# Patient Record
Sex: MALE | Race: WHITE | NOT HISPANIC OR LATINO | Employment: OTHER | URBAN - METROPOLITAN AREA
[De-identification: names, ages, dates, MRNs, and addresses within clinical notes are randomized per-mention and may not be internally consistent; named-entity substitution may affect disease eponyms.]

---

## 2023-09-19 PROBLEM — F10.20 ALCOHOL DEPENDENCE (MULTI): Status: ACTIVE | Noted: 2023-09-19

## 2023-09-19 PROBLEM — M79.606 PAIN OF LOWER EXTREMITY: Status: RESOLVED | Noted: 2023-09-19 | Resolved: 2023-09-19

## 2023-09-19 PROBLEM — F10.939 ALCOHOL WITHDRAWAL SYNDROME (MULTI): Status: RESOLVED | Noted: 2023-09-19 | Resolved: 2023-09-19

## 2023-09-19 PROBLEM — F51.05 INSOMNIA DUE TO OTHER MENTAL DISORDER: Status: ACTIVE | Noted: 2023-09-19

## 2023-09-19 PROBLEM — R73.09 BLOOD GLUCOSE ABNORMAL: Status: ACTIVE | Noted: 2023-09-19

## 2023-09-19 PROBLEM — Z91.89 AT RISK FOR INJURY RELATED TO RESTRAINTS: Status: ACTIVE | Noted: 2023-09-19

## 2023-09-19 PROBLEM — G92.8 TOXIC METABOLIC ENCEPHALOPATHY: Status: ACTIVE | Noted: 2023-09-19

## 2023-09-19 PROBLEM — E83.42 HYPOMAGNESEMIA: Status: ACTIVE | Noted: 2023-09-19

## 2023-09-19 PROBLEM — R40.1 CLOUDED CONSCIOUSNESS: Status: RESOLVED | Noted: 2023-09-19 | Resolved: 2023-09-19

## 2023-09-19 PROBLEM — R07.9 CHEST PAIN: Status: RESOLVED | Noted: 2023-09-19 | Resolved: 2023-09-19

## 2023-09-19 PROBLEM — N17.9 ACUTE RENAL FAILURE SYNDROME (CMS-HCC): Status: RESOLVED | Noted: 2023-09-19 | Resolved: 2023-09-19

## 2023-09-19 PROBLEM — I10 BENIGN ESSENTIAL HYPERTENSION: Status: ACTIVE | Noted: 2023-09-19

## 2023-09-19 PROBLEM — R10.9 ABDOMINAL PAIN: Status: RESOLVED | Noted: 2023-09-19 | Resolved: 2023-09-19

## 2023-09-19 PROBLEM — E87.5 HYPERKALEMIA: Status: ACTIVE | Noted: 2023-09-19

## 2023-09-19 PROBLEM — F41.1 GENERALIZED ANXIETY DISORDER: Status: ACTIVE | Noted: 2023-09-19

## 2023-09-19 PROBLEM — R56.9 SEIZURE (MULTI): Status: ACTIVE | Noted: 2023-09-19

## 2023-09-19 PROBLEM — F99 INSOMNIA DUE TO OTHER MENTAL DISORDER: Status: ACTIVE | Noted: 2023-09-19

## 2023-09-19 PROBLEM — E87.1 HYPONATREMIA: Status: ACTIVE | Noted: 2023-09-19

## 2023-09-19 PROBLEM — F17.213 NICOTINE DEPENDENCE, CIGARETTES, WITH WITHDRAWAL: Status: ACTIVE | Noted: 2023-09-19

## 2023-09-19 PROBLEM — J18.9 PNEUMONIA: Status: RESOLVED | Noted: 2023-09-19 | Resolved: 2023-09-19

## 2023-09-19 PROBLEM — M50.20 DISPLACEMENT OF CERVICAL INTERVERTEBRAL DISC WITHOUT MYELOPATHY: Status: RESOLVED | Noted: 2023-09-19 | Resolved: 2023-09-19

## 2023-09-19 PROBLEM — R94.5 ABNORMAL LIVER FUNCTION: Status: ACTIVE | Noted: 2023-09-19

## 2023-09-19 PROBLEM — E87.8 ELECTROLYTE IMBALANCE: Status: RESOLVED | Noted: 2023-09-19 | Resolved: 2023-09-19

## 2023-09-19 PROBLEM — F33.2 MAJOR DEPRESSIVE DISORDER, RECURRENT SEVERE WITHOUT PSYCHOTIC FEATURES (MULTI): Status: ACTIVE | Noted: 2023-09-19
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
## 2023-09-19 PROBLEM — K70.10 ALCOHOLIC HEPATITIS (MULTI): Status: ACTIVE | Noted: 2023-09-19

## 2023-09-19 PROBLEM — E11.9 DIABETES MELLITUS, TYPE 2 (MULTI): Status: ACTIVE | Noted: 2023-09-19

## 2023-09-19 RX ORDER — METOPROLOL SUCCINATE 50 MG/1
1 TABLET, EXTENDED RELEASE ORAL DAILY
COMMUNITY

## 2023-09-19 RX ORDER — VENLAFAXINE HYDROCHLORIDE 37.5 MG/1
2 TABLET, EXTENDED RELEASE ORAL
COMMUNITY

## 2023-09-19 RX ORDER — LANOLIN ALCOHOL/MO/W.PET/CERES
CREAM (GRAM) TOPICAL
COMMUNITY

## 2023-09-19 RX ORDER — GABAPENTIN 300 MG/1
1 CAPSULE ORAL 3 TIMES DAILY
COMMUNITY

## 2023-09-19 RX ORDER — AMLODIPINE BESYLATE 10 MG/1
1 TABLET ORAL DAILY
COMMUNITY

## 2023-09-19 RX ORDER — FUROSEMIDE 20 MG/1
TABLET ORAL
COMMUNITY

## 2023-09-19 RX ORDER — LOSARTAN POTASSIUM 100 MG/1
1 TABLET ORAL DAILY
COMMUNITY

## 2023-09-19 RX ORDER — METFORMIN HYDROCHLORIDE 500 MG/1
1 TABLET ORAL
COMMUNITY

## 2023-09-19 RX ORDER — CELECOXIB 100 MG/1
1 CAPSULE ORAL DAILY
COMMUNITY
Start: 2022-05-23

## 2023-09-19 RX ORDER — OMEPRAZOLE 40 MG/1
1 CAPSULE, DELAYED RELEASE ORAL DAILY
COMMUNITY

## 2023-09-19 RX ORDER — ESTAZOLAM 2 MG/1
TABLET ORAL
COMMUNITY

## 2023-12-06 ENCOUNTER — HOSPITAL ENCOUNTER (OUTPATIENT)
Dept: RADIOLOGY | Facility: EXTERNAL LOCATION | Age: 68
Discharge: HOME | End: 2023-12-06

## 2023-12-06 DIAGNOSIS — R22.43 LOCALIZED SWELLING, MASS AND LUMP, LOWER LIMB, BILATERAL: ICD-10-CM

## 2023-12-11 ENCOUNTER — OFFICE VISIT (OUTPATIENT)
Dept: ORTHOPEDIC SURGERY | Facility: CLINIC | Age: 68
End: 2023-12-11
Payer: COMMERCIAL

## 2023-12-11 DIAGNOSIS — M54.12 CERVICAL RADICULITIS: ICD-10-CM

## 2023-12-11 DIAGNOSIS — S43.492A OTHER SPRAIN OF LEFT SHOULDER JOINT, INITIAL ENCOUNTER: Primary | ICD-10-CM

## 2023-12-11 PROCEDURE — 99213 OFFICE O/P EST LOW 20 MIN: CPT | Performed by: ORTHOPAEDIC SURGERY

## 2023-12-11 ASSESSMENT — ENCOUNTER SYMPTOMS
CHILLS: 0
SHORTNESS OF BREATH: 0
ARTHRALGIAS: 1
WHEEZING: 0
FATIGUE: 0
FEVER: 0

## 2023-12-11 ASSESSMENT — PAIN - FUNCTIONAL ASSESSMENT: PAIN_FUNCTIONAL_ASSESSMENT: 0-10

## 2023-12-11 ASSESSMENT — PAIN SCALES - GENERAL: PAINLEVEL_OUTOF10: 4

## 2023-12-11 NOTE — PROGRESS NOTES
Reason for Appointment  Chief Complaint   Patient presents with    Left Shoulder - Follow-up     History of Present Illness  Patient is a 68 y.o. male here today for follow-up evaluation of left shoulder pain. Since his last visit, he has not had any changes in symptoms. He is not working. His last treatment included a cervical block and massage therapy which have helped greatly. A shoulder injection also provided long-term relief. No other updates to PMH, allergies, or medications.     Past Medical History:   Diagnosis Date    Abdominal pain 09/19/2023    Accidental fall 09/19/2023    Acute renal failure syndrome (CMS/HCC) 09/19/2023    Chest pain 09/19/2023    Clouded consciousness 09/19/2023    Displacement of cervical intervertebral disc without myelopathy 09/19/2023    Electrolyte imbalance 09/19/2023    Pain of lower extremity 09/19/2023       Past Surgical History:   Procedure Laterality Date    MR HEAD ANGIO WO IV CONTRAST  6/5/2017    MR HEAD ANGIO WO IV CONTRAST [facility]       Medication Documentation Review Audit       Reviewed by Rose Mary Brunson MA (Medical Assistant) on 12/11/23 at 1048      Medication Order Taking? Sig Documenting Provider Last Dose Status   amLODIPine (Norvasc) 10 mg tablet [id number] Yes Take 1 tablet (10 mg) by mouth once daily. Historical Provider, MD Taking Active   celecoxib (CeleBREX) 100 mg capsule [id number] Yes Take 1 capsule (100 mg) by mouth once daily. Historical Provider, MD Taking Active   estazolam (Prosom) 2 mg tablet [id number] Yes (Schedule IV Drug) Oral for 30 Historical Provider, MD Taking Active   furosemide (Lasix) 20 mg tablet [id number] Yes Take by mouth. Historical Provider, MD Taking Active   gabapentin (Neurontin) 300 mg capsule [id number] Yes Take 1 capsule (300 mg) by mouth 3 times a day. Historical Provider, MD Taking Active   losartan (Cozaar) 100 mg tablet [id number] Yes Take 1 tablet (100 mg) by mouth once daily. Historical Provider, MD Taking  Active   magnesium oxide (Mag-Ox) 400 mg (241.3 mg magnesium) tablet [id number] Yes Take by mouth. Historical Provider, MD Taking Active   metFORMIN (Glucophage) 500 mg tablet [id number] Yes Take 1 tablet (500 mg) by mouth 2 times a day with meals. Historical Provider, MD Taking Active   metoprolol succinate XL (Toprol-XL) 50 mg 24 hr tablet [id number] Yes Take 1 tablet (50 mg) by mouth once daily. Historical Provider, MD Taking Active   omeprazole (PriLOSEC) 40 mg DR capsule [id number] Yes Take 1 capsule (40 mg) by mouth once daily. Historical Provider, MD Taking Active   venlafaxine 37.5 mg 24 hr tablet [id number] Yes Take 2 tablets (75 mg) by mouth once daily with a meal. take 1 tablet by mouth once daily WITH FOOD FOR 7 DAYS THEN TAKE 2 TABLETS DAILY Historical Provider, MD Taking Active                    Allergies   Allergen Reactions    Celecoxib Hives       Review of Systems   Constitutional:  Negative for chills, fatigue and fever.   Respiratory:  Negative for shortness of breath and wheezing.    Cardiovascular:  Negative for chest pain and leg swelling.   Musculoskeletal:  Positive for arthralgias.   All other systems reviewed and are negative.      Exam   On exam of the left shoulder, he has 140 degrees of forward flexion, cuff strength is maintained in internal and external rotation, there is some joint line tenderness today, left trap tenderness, no gross atrophy, negative Tripp's sign    Assessment   Encounter Diagnoses   Name Primary?    Other sprain of left shoulder joint, initial encounter Yes    Cervical radiculitis      Plan   Cervical block and massage are the mainstay of treatment because at this point the symptoms appear to be coming from the neck and not the shoulder. He will call if he has any issues or new symptoms. Follow-up in 4 months.     Cecelia MORTON, attpoonam that this documentation has been prepared under the direction and in the presence of Anup Mcconnell MD. By signing below, PRAVEEN  Anup Mcconnell MD, personally performed the services described in this documentation. All medical record entries made by the scribe were at my direction and in my presence. I have reviewed the chart and agree that the record reflects my personal performance and is accurate and complete. 12/11/23

## 2024-04-15 ENCOUNTER — OFFICE VISIT (OUTPATIENT)
Dept: ORTHOPEDIC SURGERY | Facility: CLINIC | Age: 69
End: 2024-04-15
Payer: COMMERCIAL

## 2024-04-15 DIAGNOSIS — M54.12 CERVICAL RADICULITIS: ICD-10-CM

## 2024-04-15 DIAGNOSIS — S43.492A OTHER SPRAIN OF LEFT SHOULDER JOINT, INITIAL ENCOUNTER: Primary | ICD-10-CM

## 2024-04-15 PROCEDURE — 99213 OFFICE O/P EST LOW 20 MIN: CPT | Performed by: ORTHOPAEDIC SURGERY

## 2024-04-15 ASSESSMENT — ENCOUNTER SYMPTOMS
SHORTNESS OF BREATH: 0
CHILLS: 0
WHEEZING: 0
FEVER: 0
FATIGUE: 0
ARTHRALGIAS: 1

## 2024-04-15 ASSESSMENT — PAIN - FUNCTIONAL ASSESSMENT: PAIN_FUNCTIONAL_ASSESSMENT: 0-10

## 2024-04-15 ASSESSMENT — PAIN SCALES - GENERAL: PAINLEVEL_OUTOF10: 6

## 2024-04-15 NOTE — PROGRESS NOTES
Reason for Appointment  Chief Complaint   Patient presents with    Left Shoulder - Follow-up     History of Present Illness  Patient is a 69 y.o. male here today for follow-up evaluation of his left shoulder. Since his last visit, his shoulder pain has been at baseline. He has sharp, anterior shoulder pain that is worse with lifting. He is not currently working. It has been some time since he last had cervical blocks or massage therapy. He is approved for massage therapy at [facility] for the stiffness in his neck. No other updates to [facility], allergies, or medications.     Past Medical History:   Diagnosis Date    Abdominal pain 09/19/2023    Accidental fall 09/19/2023    Acute renal failure syndrome (CMS-HCC) 09/19/2023    Chest pain 09/19/2023    Clouded consciousness 09/19/2023    Displacement of cervical intervertebral disc without myelopathy 09/19/2023    Electrolyte imbalance 09/19/2023    Pain of lower extremity 09/19/2023       Past Surgical History:   Procedure Laterality Date    MR HEAD ANGIO WO IV CONTRAST  6/5/2017    MR HEAD ANGIO WO IV CONTRAST [facility] INPATIENT LEGACY       Medication Documentation Review Audit       Reviewed by Rose Mary Brunson MA (Medical Assistant) on 04/15/24 at 1054      Medication Order Taking? Sig Documenting Provider Last Dose Status   amLODIPine (Norvasc) 10 mg tablet [id number] Yes Take 1 tablet (10 mg) by mouth once daily. Historical Provider, MD Taking Active   celecoxib (CeleBREX) 100 mg capsule [id number] Yes Take 1 capsule (100 mg) by mouth once daily. Historical Provider, MD Taking Active   estazolam (Prosom) 2 mg tablet [id number] Yes (Schedule IV Drug) Oral for 30 Historical Provider, MD Taking Active   furosemide (Lasix) 20 mg tablet [id number] Yes Take by mouth. Historical Provider, MD Taking Active   gabapentin (Neurontin) 300 mg capsule [id number] Yes Take 1 capsule (300 mg) by mouth 3 times a day. Historical Provider, MD Taking Active   losartan (Cozaar) 100 mg tablet  [id number] Yes Take 1 tablet (100 mg) by mouth once daily. Historical Provider, MD Taking Active   magnesium oxide (Mag-Ox) 400 mg (241.3 mg magnesium) tablet [id number] Yes Take by mouth. Historical Provider, MD Taking Active   metFORMIN (Glucophage) 500 mg tablet [id number] Yes Take 1 tablet (500 mg) by mouth 2 times a day with meals. Historical Provider, MD Taking Active   metoprolol succinate XL (Toprol-XL) 50 mg 24 hr tablet [id number] Yes Take 1 tablet (50 mg) by mouth once daily. Historical Provider, MD Taking Active   omeprazole (PriLOSEC) 40 mg DR capsule [id number] Yes Take 1 capsule (40 mg) by mouth once daily. Historical Provider, MD Taking Active   venlafaxine 37.5 mg 24 hr tablet [id number] Yes Take 2 tablets (75 mg) by mouth once daily with breakfast. take 1 tablet by mouth once daily WITH FOOD FOR 7 DAYS THEN TAKE 2 TABLETS DAILY Historical Provider, MD Taking Active                    Allergies   Allergen Reactions    Celecoxib Hives       Review of Systems   Constitutional:  Negative for chills, fatigue and fever.   Respiratory:  Negative for shortness of breath and wheezing.    Cardiovascular:  Negative for chest pain and leg swelling.   Musculoskeletal:  Positive for arthralgias.   All other systems reviewed and are negative.      Exam   On exam of the left arm, he has 130 degrees of elevation, sourness for jody cervical region, cuff strength is maintained today, more joint line tenderness than biceps tenderness, mildly positive impingement sign, good pulses, good sensation    Assessment   Encounter Diagnoses   Name Primary?    Other sprain of left shoulder joint, initial encounter Yes    Cervical radiculitis        Plan   Most of his symptoms at present are coming from the neck. We did discuss future injections to the shoulder, but these should only be administered as needed to prevent further deterioration of the shoulder. Follow-up in 4 months.     ICecelia, attest that this documentation  has been prepared under the direction and in the presence of Anup Mcconnell MD. By signing below, I, Anup Mcconnell MD, personally performed the services described in this documentation. All medical record entries made by the scribe were at my direction and in my presence. I have reviewed the chart and agree that the record reflects my personal performance and is accurate and complete. 04/15/24

## 2024-06-21 ENCOUNTER — HOSPITAL ENCOUNTER (EMERGENCY)
Facility: HOSPITAL | Age: 69
Discharge: HOME | End: 2024-06-21
Attending: STUDENT IN AN ORGANIZED HEALTH CARE EDUCATION/TRAINING PROGRAM
Payer: MEDICARE

## 2024-06-21 ENCOUNTER — HOSPITAL ENCOUNTER (OUTPATIENT)
Dept: CARDIOLOGY | Facility: HOSPITAL | Age: 69
Discharge: HOME | End: 2024-06-21
Payer: MEDICARE

## 2024-06-21 VITALS
HEART RATE: 84 BPM | HEIGHT: 67 IN | BODY MASS INDEX: 29.03 KG/M2 | SYSTOLIC BLOOD PRESSURE: 134 MMHG | DIASTOLIC BLOOD PRESSURE: 72 MMHG | TEMPERATURE: 98.4 F | RESPIRATION RATE: 18 BRPM | WEIGHT: 185 LBS | OXYGEN SATURATION: 98 %

## 2024-06-21 DIAGNOSIS — M54.2 NECK PAIN: Primary | ICD-10-CM

## 2024-06-21 DIAGNOSIS — F10.920 ALCOHOLIC INTOXICATION WITHOUT COMPLICATION (CMS-HCC): ICD-10-CM

## 2024-06-21 LAB
ALBUMIN SERPL-MCNC: 3.9 G/DL (ref 3.5–5)
ALP BLD-CCNC: 158 U/L (ref 35–125)
ALT SERPL-CCNC: 59 U/L (ref 5–40)
AMPHETAMINES UR QL SCN>1000 NG/ML: NEGATIVE
ANION GAP SERPL CALC-SCNC: 14 MMOL/L
APAP SERPL-MCNC: <5 UG/ML
APPEARANCE UR: CLEAR
AST SERPL-CCNC: 59 U/L (ref 5–40)
BARBITURATES UR QL SCN>300 NG/ML: NEGATIVE
BASOPHILS # BLD AUTO: 0.04 X10*3/UL (ref 0–0.1)
BASOPHILS NFR BLD AUTO: 0.9 %
BENZODIAZ UR QL SCN>300 NG/ML: POSITIVE
BILIRUB SERPL-MCNC: 0.7 MG/DL (ref 0.1–1.2)
BILIRUB UR STRIP.AUTO-MCNC: NEGATIVE MG/DL
BUN SERPL-MCNC: 5 MG/DL (ref 8–25)
BZE UR QL SCN>300 NG/ML: NEGATIVE
CALCIUM SERPL-MCNC: 9 MG/DL (ref 8.5–10.4)
CANNABINOIDS UR QL SCN>50 NG/ML: NEGATIVE
CHLORIDE SERPL-SCNC: 90 MMOL/L (ref 97–107)
CO2 SERPL-SCNC: 25 MMOL/L (ref 24–31)
COLOR UR: ABNORMAL
CREAT SERPL-MCNC: 0.7 MG/DL (ref 0.4–1.6)
EGFRCR SERPLBLD CKD-EPI 2021: >90 ML/MIN/1.73M*2
EOSINOPHIL # BLD AUTO: 0.19 X10*3/UL (ref 0–0.7)
EOSINOPHIL NFR BLD AUTO: 4.3 %
ERYTHROCYTE [DISTWIDTH] IN BLOOD BY AUTOMATED COUNT: 11.6 % (ref 11.5–14.5)
ETHANOL SERPL-MCNC: 0.26 G/DL
FENTANYL+NORFENTANYL UR QL SCN: NEGATIVE
GLUCOSE SERPL-MCNC: 99 MG/DL (ref 65–99)
GLUCOSE UR STRIP.AUTO-MCNC: NORMAL MG/DL
HCT VFR BLD AUTO: 34.2 % (ref 41–52)
HGB BLD-MCNC: 12 G/DL (ref 13.5–17.5)
IMM GRANULOCYTES # BLD AUTO: 0.02 X10*3/UL (ref 0–0.7)
IMM GRANULOCYTES NFR BLD AUTO: 0.5 % (ref 0–0.9)
KETONES UR STRIP.AUTO-MCNC: NEGATIVE MG/DL
LEUKOCYTE ESTERASE UR QL STRIP.AUTO: NEGATIVE
LYMPHOCYTES # BLD AUTO: 1.29 X10*3/UL (ref 1.2–4.8)
LYMPHOCYTES NFR BLD AUTO: 29.5 %
MCH RBC QN AUTO: 33 PG (ref 26–34)
MCHC RBC AUTO-ENTMCNC: 35.1 G/DL (ref 32–36)
MCV RBC AUTO: 94 FL (ref 80–100)
METHADONE UR QL SCN>300 NG/ML: NEGATIVE
MONOCYTES # BLD AUTO: 0.38 X10*3/UL (ref 0.1–1)
MONOCYTES NFR BLD AUTO: 8.7 %
NEUTROPHILS # BLD AUTO: 2.46 X10*3/UL (ref 1.2–7.7)
NEUTROPHILS NFR BLD AUTO: 56.1 %
NITRITE UR QL STRIP.AUTO: NEGATIVE
NRBC BLD-RTO: 0 /100 WBCS (ref 0–0)
OPIATES UR QL SCN>300 NG/ML: NEGATIVE
OXYCODONE UR QL: NEGATIVE
PCP UR QL SCN>25 NG/ML: NEGATIVE
PH UR STRIP.AUTO: 6 [PH]
PLATELET # BLD AUTO: 120 X10*3/UL (ref 150–450)
POTASSIUM SERPL-SCNC: 4.6 MMOL/L (ref 3.4–5.1)
PROT SERPL-MCNC: 6.7 G/DL (ref 5.9–7.9)
PROT UR STRIP.AUTO-MCNC: NEGATIVE MG/DL
RBC # BLD AUTO: 3.64 X10*6/UL (ref 4.5–5.9)
RBC # UR STRIP.AUTO: NEGATIVE /UL
SALICYLATES SERPL-MCNC: <0 MG/DL
SODIUM SERPL-SCNC: 129 MMOL/L (ref 133–145)
SP GR UR STRIP.AUTO: 1
UROBILINOGEN UR STRIP.AUTO-MCNC: NORMAL MG/DL
WBC # BLD AUTO: 4.4 X10*3/UL (ref 4.4–11.3)

## 2024-06-21 PROCEDURE — 80053 COMPREHEN METABOLIC PANEL: CPT | Performed by: PHYSICIAN ASSISTANT

## 2024-06-21 PROCEDURE — 99285 EMERGENCY DEPT VISIT HI MDM: CPT | Mod: 25

## 2024-06-21 PROCEDURE — 85025 COMPLETE CBC W/AUTO DIFF WBC: CPT | Performed by: PHYSICIAN ASSISTANT

## 2024-06-21 PROCEDURE — 96375 TX/PRO/DX INJ NEW DRUG ADDON: CPT

## 2024-06-21 PROCEDURE — 2500000004 HC RX 250 GENERAL PHARMACY W/ HCPCS (ALT 636 FOR OP/ED): Performed by: PHYSICIAN ASSISTANT

## 2024-06-21 PROCEDURE — 96374 THER/PROPH/DIAG INJ IV PUSH: CPT

## 2024-06-21 PROCEDURE — 96361 HYDRATE IV INFUSION ADD-ON: CPT

## 2024-06-21 PROCEDURE — 36415 COLL VENOUS BLD VENIPUNCTURE: CPT | Performed by: PHYSICIAN ASSISTANT

## 2024-06-21 PROCEDURE — 93005 ELECTROCARDIOGRAM TRACING: CPT

## 2024-06-21 PROCEDURE — 81003 URINALYSIS AUTO W/O SCOPE: CPT | Performed by: PHYSICIAN ASSISTANT

## 2024-06-21 PROCEDURE — 80307 DRUG TEST PRSMV CHEM ANLYZR: CPT | Performed by: PHYSICIAN ASSISTANT

## 2024-06-21 PROCEDURE — 80143 DRUG ASSAY ACETAMINOPHEN: CPT | Performed by: PHYSICIAN ASSISTANT

## 2024-06-21 RX ORDER — DIPHENHYDRAMINE HYDROCHLORIDE 50 MG/ML
25 INJECTION INTRAMUSCULAR; INTRAVENOUS ONCE
Status: COMPLETED | OUTPATIENT
Start: 2024-06-21 | End: 2024-06-21

## 2024-06-21 RX ORDER — KETOROLAC TROMETHAMINE 30 MG/ML
15 INJECTION, SOLUTION INTRAMUSCULAR; INTRAVENOUS ONCE
Status: COMPLETED | OUTPATIENT
Start: 2024-06-21 | End: 2024-06-21

## 2024-06-21 ASSESSMENT — COLUMBIA-SUICIDE SEVERITY RATING SCALE - C-SSRS
6. HAVE YOU EVER DONE ANYTHING, STARTED TO DO ANYTHING, OR PREPARED TO DO ANYTHING TO END YOUR LIFE?: NO
2. HAVE YOU ACTUALLY HAD ANY THOUGHTS OF KILLING YOURSELF?: NO
1. IN THE PAST MONTH, HAVE YOU WISHED YOU WERE DEAD OR WISHED YOU COULD GO TO SLEEP AND NOT WAKE UP?: NO

## 2024-06-21 ASSESSMENT — PAIN SCALES - GENERAL: PAINLEVEL_OUTOF10: 10 - WORST POSSIBLE PAIN

## 2024-06-21 ASSESSMENT — PAIN DESCRIPTION - LOCATION: LOCATION: SHOULDER

## 2024-06-21 ASSESSMENT — PAIN DESCRIPTION - PAIN TYPE: TYPE: CHRONIC PAIN

## 2024-06-21 ASSESSMENT — PAIN - FUNCTIONAL ASSESSMENT: PAIN_FUNCTIONAL_ASSESSMENT: 0-10

## 2024-06-21 NOTE — PROGRESS NOTES
"Social Work Note  SS consult received for 68y/o male who presented to [facility] ED with c/o severe neck pain and alcohol intoxication. BAL 0.259, tox positive for benzos. Patient initially stated to providers that he was not interested in alcohol abuse treatment, but later during visit stated that he was. Peer support and SW met FTF with patient to discuss. Patient A&Ox4, cooperative, though with slow, slurred speech. When SW met with patient, patient stated he was not interested in treatment at this time, only wanted \"a card\" in order to contact peer support. Patient stating he drinks daily, either beer or bourbon, varying amounts, but at most typically 1/2 pint of bourbon or 12 beers. Last drank earlier this afternoon. Reports alcohol abuse issues for many years. Denies hx formal treatment for alcohol. Denies hx withdrawal seizures. States longest period of sobriety was 1.5 years, following the death of his mother. SW confirmed with patient multiple times during discussion that he was certain he did not want detox at this time. Patient declines, and appears focused on his neck pain, expressing frustration that ER is \"not doing anything\" for him. (Patient was recently administered toradol IV). SW encouraged patient to consider alcohol detox, and let staff know if he changes his mind prior to discharge from the ED. Patient agreeable. Peer support aware that patient is interested in follow-up phone call. No further SW recommendations at this time.   "

## 2024-06-21 NOTE — ED PROVIDER NOTES
"HPI   Chief Complaint   Patient presents with    Alcohol Intoxication    pain management       HPI     Patient is a 69-year-old male with a history of alcohol dependence presenting for evaluation of neck and shoulder pain.  Patient states that the pain is chronic and has been present for the last 7 years.  He follows with a doctor and pain management and is prescribed gabapentin for his pain.  Patient states that he took the gabapentin today but did not have relief.  He denies any new injury.  The patient states that he does drink daily as he drinks to take the edge off of his pain.  He came in today to seek aid with his pain relief.  He states that he drank half a pint of bourbon today.  He does admit to drinking every day, stating that it \"depends on his pain level\" how much he does drink.  He is not interested in any inpatient detoxification therapy.  He denies chest pain or shortness of breath.  He denies abdominal pain, nausea, vomiting, diarrhea or constipation.  Denies SI or HI.  He denies hallucinations or delusions.  He admits to tobacco use.  No drug use.               [place] Coma Scale Score: 15                     Patient History   Past Medical History:   Diagnosis Date    Abdominal pain 09/19/2023    Accidental fall 09/19/2023    Acute renal failure syndrome (CMS-HCC) 09/19/2023    Chest pain 09/19/2023    Clouded consciousness 09/19/2023    Displacement of cervical intervertebral disc without myelopathy 09/19/2023    Electrolyte imbalance 09/19/2023    Pain of lower extremity 09/19/2023     Past Surgical History:   Procedure Laterality Date    MR HEAD ANGIO WO IV CONTRAST  6/5/2017    MR HEAD ANGIO WO IV CONTRAST [facility]     No family history on file.  Social History     Tobacco Use    Smoking status: Every Day     Types: Cigarettes    Smokeless tobacco: Never   Substance Use Topics    Alcohol use: Not Currently    Drug use: Defer       Physical Exam   ED Triage Vitals [06/21/24 1518] "   Temperature Heart Rate Respirations BP   36.9 °C (98.4 °F) 84 18 134/72      Pulse Ox Temp src Heart Rate Source Patient Position   98 % -- Brachial Lying      BP Location FiO2 (%)     Left arm --       Physical Exam  Vitals and nursing note reviewed.   Constitutional:       Appearance: Normal appearance.   HENT:      Head: Normocephalic and atraumatic.   Eyes:      Extraocular Movements: Extraocular movements intact.      Conjunctiva/sclera: Conjunctivae normal.      Pupils: Pupils are equal, round, and reactive to light.   Cardiovascular:      Rate and Rhythm: Normal rate and regular rhythm.   Pulmonary:      Effort: Pulmonary effort is normal.      Breath sounds: Normal breath sounds.   Abdominal:      General: Abdomen is flat. Bowel sounds are normal.      Palpations: Abdomen is soft.   Musculoskeletal:         General: Normal range of motion.      Cervical back: Normal range of motion and neck supple.      Comments: There is tenderness to palpation along the bilateral anterior lateral shoulders and trapezius.  No C-spine tenderness.   Neurological:      Mental Status: He is alert.         ED Course & MDM   ED Course as of 06/21/24 1850 Fri Jun 21, 2024   1735 Electrocardiogram, 12-lead  Sinus rhythm rate of 69.  RI is normal.  QRS duration is normal.  QTc is unremarkable.  Overt ST segment elevation or T wave inversion.  No STEMI. [AH]      ED Course User Index  [AH] Nathanael Lira MD         Diagnoses as of 06/21/24 1850   Neck pain   Alcoholic intoxication without complication (CMS-HCC)       Medical Decision Making    Parts of this chart have been completed using voice recognition software. Please excuse any errors of transcription. Despite the medical decision making time stamp above-my medical decision making has taken place during the patient's entire visit. My thought process and reason for plan has been formulated from the time that I saw the patient until the time of disposition and is not specific to  one specific moment during their visit and furthermore my MDM encompasses this entire chart and not only this text box.      HPI: Detailed above.    Exam: A medically appropriate exam performed, outlined above, given the known history and presentation.    History obtained from: Patient    Social Determinants of Health considered during this visit: Tobacco user, alcohol dependence    EKG interpreted by my attending physician, reviewed by myself.    Labs Reviewed   CBC WITH AUTO DIFFERENTIAL - Abnormal       Result Value    WBC 4.4      nRBC 0.0      RBC 3.64 (*)     Hemoglobin 12.0 (*)     Hematocrit 34.2 (*)     MCV 94      MCH 33.0      MCHC 35.1      RDW 11.6      Platelets 120 (*)     Neutrophils % 56.1      Immature Granulocytes %, Automated 0.5      Lymphocytes % 29.5      Monocytes % 8.7      Eosinophils % 4.3      Basophils % 0.9      Neutrophils Absolute 2.46      Immature Granulocytes Absolute, Automated 0.02      Lymphocytes Absolute 1.29      Monocytes Absolute 0.38      Eosinophils Absolute 0.19      Basophils Absolute 0.04     COMPREHENSIVE METABOLIC PANEL - Abnormal    Glucose 99      Sodium 129 (*)     Potassium 4.6      Chloride 90 (*)     Bicarbonate 25      Urea Nitrogen 5 (*)     Creatinine 0.70      eGFR >90      Calcium 9.0      Albumin 3.9      Alkaline Phosphatase 158 (*)     Total Protein 6.7      AST 59 (*)     Bilirubin, Total 0.7      ALT 59 (*)     Anion Gap 14     DRUG SCREEN,URINE - Abnormal    Amphetamine Screen, Urine Negative      Barbiturate Screen, Urine Negative      Benzodiazepines Screen, Urine Positive (*)     Cannabinoid Screen, Urine Negative      Cocaine Metabolite Screen, Urine Negative      Fentanyl Screen, Urine Negative      Methadone Screen, Urine Negative      Opiate Screen, Urine Negative      Oxycodone Screen, Urine Negative      PCP Screen, Urine Negative      Narrative:     These toxicological screening tests provide unconfirmed qualitative measurements to aid in  treatment and diagnosis in cases of drug use or overdose. This test is used only for medical purposes. A positive result does not indicate or measure intoxication. For specific test performance or pathologist consultation, please contact the Laboratory.    The following threshold concentrations are used for these analyses.Values at or above the threshold concentration are reported as positive. Values below the threshold are reported as negative.    Drug /Screening Threshold                                                                                                 THC/CANNABINOIDS................50 ng/ml  METHADONE......................300 ng/ml  COCAINE METABOLITES............300 ng/ml  BENZODIAZEPINE.................300 ng/ml  PCP.............................25 ng/ml  OPIATE.........................300 ng/ml  AMPHETAMINE/ECSTASY...........1000 ng/ml  BARBITURATE....................200 ng/ml  OXYCODONE......................100 ng/ml  FENTANYL.........................5 ng/ml       ACUTE TOXICOLOGY PANEL, BLOOD - Abnormal    Acetaminophen <5.0      Salicylate  <0      Alcohol 0.259 (*)    URINALYSIS WITH REFLEX CULTURE AND MICROSCOPIC - Abnormal    Color, Urine Light-Yellow      Appearance, Urine Clear      Specific Gravity, Urine 1.004 (*)     pH, Urine 6.0      Protein, Urine NEGATIVE      Glucose, Urine Normal      Blood, Urine NEGATIVE      Ketones, Urine NEGATIVE      Bilirubin, Urine NEGATIVE      Urobilinogen, Urine Normal      Nitrite, Urine NEGATIVE      Leukocyte Esterase, Urine NEGATIVE     URINALYSIS WITH REFLEX CULTURE AND MICROSCOPIC    Narrative:     The following orders were created for panel order Urinalysis with Reflex Culture and Microscopic.  Procedure                               Abnormality         Status                     ---------                               -----------         ------                     Urinalysis with Reflex C...[[id number]]  Abnormal            Final result                Extra Urine Gray Tube[[id number]]                            In process                   Please view results for these tests on the individual orders.   EXTRA URINE GRAY TUBE     Medications   sodium chloride 0.9 % bolus 1,000 mL (1,000 mL intravenous New Bag 6/21/24 1646)   ketorolac (Toradol) injection 15 mg (15 mg intravenous Given 6/21/24 1646)   diphenhydrAMINE (BENADryl) injection 25 mg (25 mg intravenous Given 6/21/24 1646)     Differential diagnoses considered for this visit include: Acute alcohol intoxication versus drug intoxication versus acute on chronic pain    Considerations/further MDM:    Patient is a 69-year-old male with acute on chronic neck and shoulder pain with alcohol dependence presenting seeking relief of his pain.  Vital signs are hemodynamically stable within normal limits.  Physical exam demonstrated a well-nourished well-developed male in no acute distress.  Given the patient was presenting with chronic pain without new trauma, diagnostic imaging was foregone for this encounter.  Toradol was ordered for pain relief with Benadryl given that he had a reaction of hives to celecoxib.  Fluids were also ordered with basic labs and a social work consultation.     CBC was within normal limits.  CMP shows some mild elevation in liver enzyme testing, likely related to patient's alcohol abuse.  He was not complaining of any abdominal pain.  Urinalysis is negative for infection.  Positive for benzodiazepine use.  Alcohol was found to be 0.259.     spoke to the patient who was debating on inpatient alcohol detox.  He then decided that he just wanted outpatient resources.  The worker did not see a need for inpatient psychiatric placement at this point in time.     Patient's workup today was markable only for positive alcohol use.  Patient did obtain a sober ride as confirmed by nursing staff.  Patient was evaluated independently by my attending physician who agreed that discharge  disposition was found to be appropriate.  Return precautions discussed with the patient, and he was released to home in good condition with the company of his sober ride.    All of the patient's questions were answered to the best of my ability. Patient states understanding that they have been screened for an emergency today, and we have not found any etiology of symptoms that requires emergent treatment or admission to the hospital at this point. They understand that they have not had definitive care day and require follow-up for treatment of their condition. They also state understanding that they may have an emergent condition that may potentially have not of detected at this visit and they must return to the emergency department if they develop any worsening of symptoms or new complaints.    Patient was seen in conjunction with attending physician Dr. Saad Cartagena  Patient's history, physical exam, diagnostic studies, and treatment plan were discussed thoroughly.  Procedure  Procedures     Marlee Zuniga PA-C  06/21/24 6219

## 2024-06-21 NOTE — ED TRIAGE NOTES
"Patient presents to the ER via EMS for a complaint of chronic neck/shoulder pain, pain management and alcohol intoxication. Patient states he takes 1 gabapentin daily for pain management but it is not working today due to his alcohol consumption. Pt is complaining of exacerbation of chronic musculoskeletal pain in the cervical spine and right shoulder. Pt also endorses to having consumed \"one case of beer\" today.   "

## 2024-06-21 NOTE — DISCHARGE INSTRUCTIONS
Thank you for choosing [facility] for your healthcare needs today!    You have chosen to leave the emergency department in the company of a sober ride.  There were no acute findings in your workup today that warranted hospital admission or inpatient management.    Return to the emergency department immediately if you would like to go into inpatient detoxification therapy or have any other concerns that she would like to be evaluated for.    Follow-up with your primary care provider as recommended after today's visit.  Please schedule an appointment with them as soon as you are able to.

## 2024-06-22 LAB — HOLD SPECIMEN: NORMAL

## 2024-06-27 LAB
ATRIAL RATE: 69 BPM
P AXIS: 72 DEGREES
P OFFSET: 187 MS
P ONSET: 131 MS
PR INTERVAL: 176 MS
Q ONSET: 219 MS
QRS COUNT: 11 BEATS
QRS DURATION: 98 MS
QT INTERVAL: 402 MS
QTC CALCULATION(BAZETT): 430 MS
QTC FREDERICIA: 421 MS
R AXIS: 77 DEGREES
T AXIS: 72 DEGREES
T OFFSET: 420 MS
VENTRICULAR RATE: 69 BPM

## 2024-07-11 ENCOUNTER — APPOINTMENT (OUTPATIENT)
Dept: CARDIOLOGY | Facility: HOSPITAL | Age: 69
End: 2024-07-11
Payer: MEDICARE

## 2024-07-11 ENCOUNTER — HOSPITAL ENCOUNTER (EMERGENCY)
Facility: HOSPITAL | Age: 69
Discharge: OTHER INSTITUTION | End: 2024-07-11
Attending: EMERGENCY MEDICINE
Payer: MEDICARE

## 2024-07-11 VITALS
DIASTOLIC BLOOD PRESSURE: 73 MMHG | BODY MASS INDEX: 28.25 KG/M2 | TEMPERATURE: 97.9 F | WEIGHT: 180 LBS | OXYGEN SATURATION: 95 % | HEART RATE: 73 BPM | HEIGHT: 67 IN | SYSTOLIC BLOOD PRESSURE: 151 MMHG | RESPIRATION RATE: 18 BRPM

## 2024-07-11 DIAGNOSIS — F10.920 ACUTE ALCOHOLIC INTOXICATION WITHOUT COMPLICATION (CMS-HCC): ICD-10-CM

## 2024-07-11 DIAGNOSIS — F10.10 ALCOHOL ABUSE: Primary | ICD-10-CM

## 2024-07-11 LAB
ALBUMIN SERPL-MCNC: 4.3 G/DL (ref 3.5–5)
ALP BLD-CCNC: 252 U/L (ref 35–125)
ALT SERPL-CCNC: 48 U/L (ref 5–40)
AMPHETAMINES UR QL SCN>1000 NG/ML: NEGATIVE
ANION GAP SERPL CALC-SCNC: 16 MMOL/L
APAP SERPL-MCNC: <5 UG/ML
AST SERPL-CCNC: 67 U/L (ref 5–40)
BARBITURATES UR QL SCN>300 NG/ML: NEGATIVE
BASOPHILS # BLD AUTO: 0.02 X10*3/UL (ref 0–0.1)
BASOPHILS NFR BLD AUTO: 0.3 %
BENZODIAZ UR QL SCN>300 NG/ML: NEGATIVE
BILIRUB SERPL-MCNC: 0.6 MG/DL (ref 0.1–1.2)
BUN SERPL-MCNC: 4 MG/DL (ref 8–25)
BZE UR QL SCN>300 NG/ML: NEGATIVE
CALCIUM SERPL-MCNC: 9.2 MG/DL (ref 8.5–10.4)
CANNABINOIDS UR QL SCN>50 NG/ML: NEGATIVE
CHLORIDE SERPL-SCNC: 83 MMOL/L (ref 97–107)
CO2 SERPL-SCNC: 24 MMOL/L (ref 24–31)
CREAT SERPL-MCNC: 0.7 MG/DL (ref 0.4–1.6)
EGFRCR SERPLBLD CKD-EPI 2021: >90 ML/MIN/1.73M*2
EOSINOPHIL # BLD AUTO: 0.01 X10*3/UL (ref 0–0.7)
EOSINOPHIL NFR BLD AUTO: 0.2 %
ERYTHROCYTE [DISTWIDTH] IN BLOOD BY AUTOMATED COUNT: 14.3 % (ref 11.5–14.5)
ETHANOL SERPL-MCNC: 0.31 G/DL
FENTANYL+NORFENTANYL UR QL SCN: NEGATIVE
GLUCOSE SERPL-MCNC: 114 MG/DL (ref 65–99)
HCT VFR BLD AUTO: 35.3 % (ref 41–52)
HGB BLD-MCNC: 12.6 G/DL (ref 13.5–17.5)
IMM GRANULOCYTES # BLD AUTO: 0.1 X10*3/UL (ref 0–0.7)
IMM GRANULOCYTES NFR BLD AUTO: 1.7 % (ref 0–0.9)
LYMPHOCYTES # BLD AUTO: 0.71 X10*3/UL (ref 1.2–4.8)
LYMPHOCYTES NFR BLD AUTO: 12.4 %
MCH RBC QN AUTO: 34.1 PG (ref 26–34)
MCHC RBC AUTO-ENTMCNC: 35.7 G/DL (ref 32–36)
MCV RBC AUTO: 95 FL (ref 80–100)
METHADONE UR QL SCN>300 NG/ML: NEGATIVE
MONOCYTES # BLD AUTO: 0.45 X10*3/UL (ref 0.1–1)
MONOCYTES NFR BLD AUTO: 7.9 %
NEUTROPHILS # BLD AUTO: 4.44 X10*3/UL (ref 1.2–7.7)
NEUTROPHILS NFR BLD AUTO: 77.5 %
NRBC BLD-RTO: 0 /100 WBCS (ref 0–0)
OPIATES UR QL SCN>300 NG/ML: NEGATIVE
OXYCODONE UR QL: NEGATIVE
PCP UR QL SCN>25 NG/ML: NEGATIVE
PLATELET # BLD AUTO: 226 X10*3/UL (ref 150–450)
POLYCHROMASIA BLD QL SMEAR: NORMAL
POTASSIUM SERPL-SCNC: 4.4 MMOL/L (ref 3.4–5.1)
PROT SERPL-MCNC: 6.9 G/DL (ref 5.9–7.9)
RBC # BLD AUTO: 3.7 X10*6/UL (ref 4.5–5.9)
RBC MORPH BLD: NORMAL
SALICYLATES SERPL-MCNC: <0 MG/DL
SODIUM SERPL-SCNC: 123 MMOL/L (ref 133–145)
WBC # BLD AUTO: 5.7 X10*3/UL (ref 4.4–11.3)

## 2024-07-11 PROCEDURE — 80320 DRUG SCREEN QUANTALCOHOLS: CPT | Performed by: EMERGENCY MEDICINE

## 2024-07-11 PROCEDURE — 99283 EMERGENCY DEPT VISIT LOW MDM: CPT

## 2024-07-11 PROCEDURE — 84075 ASSAY ALKALINE PHOSPHATASE: CPT | Performed by: EMERGENCY MEDICINE

## 2024-07-11 PROCEDURE — 85025 COMPLETE CBC W/AUTO DIFF WBC: CPT | Performed by: EMERGENCY MEDICINE

## 2024-07-11 PROCEDURE — 93005 ELECTROCARDIOGRAM TRACING: CPT

## 2024-07-11 PROCEDURE — 2500000001 HC RX 250 WO HCPCS SELF ADMINISTERED DRUGS (ALT 637 FOR MEDICARE OP): Performed by: EMERGENCY MEDICINE

## 2024-07-11 PROCEDURE — 99285 EMERGENCY DEPT VISIT HI MDM: CPT

## 2024-07-11 PROCEDURE — 2500000002 HC RX 250 W HCPCS SELF ADMINISTERED DRUGS (ALT 637 FOR MEDICARE OP, ALT 636 FOR OP/ED): Performed by: EMERGENCY MEDICINE

## 2024-07-11 PROCEDURE — 80307 DRUG TEST PRSMV CHEM ANLYZR: CPT | Performed by: EMERGENCY MEDICINE

## 2024-07-11 PROCEDURE — 36415 COLL VENOUS BLD VENIPUNCTURE: CPT | Performed by: EMERGENCY MEDICINE

## 2024-07-11 PROCEDURE — S4991 NICOTINE PATCH NONLEGEND: HCPCS | Performed by: EMERGENCY MEDICINE

## 2024-07-11 RX ORDER — LORAZEPAM 2 MG/ML
0.5 INJECTION INTRAMUSCULAR EVERY 2 HOUR PRN
Status: DISCONTINUED | OUTPATIENT
Start: 2024-07-11 | End: 2024-07-11 | Stop reason: HOSPADM

## 2024-07-11 RX ORDER — FOLIC ACID 1 MG/1
1 TABLET ORAL DAILY
Status: DISCONTINUED | OUTPATIENT
Start: 2024-07-11 | End: 2024-07-11 | Stop reason: HOSPADM

## 2024-07-11 RX ORDER — MULTIVIT-MIN/IRON FUM/FOLIC AC 7.5 MG-4
1 TABLET ORAL DAILY
Status: DISCONTINUED | OUTPATIENT
Start: 2024-07-11 | End: 2024-07-11 | Stop reason: HOSPADM

## 2024-07-11 RX ORDER — IBUPROFEN 200 MG
1 TABLET ORAL DAILY
Status: DISCONTINUED | OUTPATIENT
Start: 2024-07-11 | End: 2024-07-11 | Stop reason: HOSPADM

## 2024-07-11 RX ORDER — LORAZEPAM 2 MG/ML
1 INJECTION INTRAMUSCULAR EVERY 2 HOUR PRN
Status: DISCONTINUED | OUTPATIENT
Start: 2024-07-11 | End: 2024-07-11 | Stop reason: HOSPADM

## 2024-07-11 RX ORDER — LANOLIN ALCOHOL/MO/W.PET/CERES
100 CREAM (GRAM) TOPICAL DAILY
Status: DISCONTINUED | OUTPATIENT
Start: 2024-07-11 | End: 2024-07-11 | Stop reason: HOSPADM

## 2024-07-11 RX ORDER — LORAZEPAM 2 MG/ML
2 INJECTION INTRAMUSCULAR EVERY 2 HOUR PRN
Status: DISCONTINUED | OUTPATIENT
Start: 2024-07-11 | End: 2024-07-11 | Stop reason: HOSPADM

## 2024-07-11 RX ADMIN — NICOTINE 1 PATCH: 21 PATCH, EXTENDED RELEASE TRANSDERMAL at 18:14

## 2024-07-11 RX ADMIN — FOLIC ACID 1 MG: 1 TABLET ORAL at 13:39

## 2024-07-11 RX ADMIN — Medication 1 TABLET: at 13:39

## 2024-07-11 RX ADMIN — Medication 100 MG: at 13:39

## 2024-07-11 ASSESSMENT — LIFESTYLE VARIABLES
HAVE PEOPLE ANNOYED YOU BY CRITICIZING YOUR DRINKING: NO
TOTAL SCORE: 0
HAVE YOU EVER FELT YOU SHOULD CUT DOWN ON YOUR DRINKING: NO
EVER HAD A DRINK FIRST THING IN THE MORNING TO STEADY YOUR NERVES TO GET RID OF A HANGOVER: NO
EVER FELT BAD OR GUILTY ABOUT YOUR DRINKING: NO

## 2024-07-11 ASSESSMENT — PAIN - FUNCTIONAL ASSESSMENT: PAIN_FUNCTIONAL_ASSESSMENT: 0-10

## 2024-07-11 ASSESSMENT — PAIN SCALES - GENERAL
PAINLEVEL_OUTOF10: 5 - MODERATE PAIN
PAINLEVEL_OUTOF10: 7
PAINLEVEL_OUTOF10: 0 - NO PAIN

## 2024-07-11 ASSESSMENT — PAIN DESCRIPTION - LOCATION: LOCATION: NECK

## 2024-07-11 ASSESSMENT — PAIN DESCRIPTION - PAIN TYPE: TYPE: CHRONIC PAIN

## 2024-07-11 NOTE — PROGRESS NOTES
"Social Work Note  SS consult received for 70y/o male who presented to [facility] ED BIB EMS from home for alcohol intoxication, voluntarily seeking alcohol detox. Initial BAL 0.314. Peer support and SW met FTF with patient to obtain history. Patient reports drinking up to 12 beers per day nearly every day. States he has always drank to alleviate neck pain, as nothing else he has been prescribed has been helpful. Currently prescribed gabapentin. Neck pain was severe last night, so he was unable to sleep, so began drinking at 4am, which is highly unusual for him. States he has drank 9 beers today. Patient states this amount didn't make him feel intoxicated at all, and didn't alleviate his pain at all. Patient states this was what prompted him to come to the ED. States he hasn't slept for the past several days due to uncontrolled neck pain. Also states he hasn't eaten for 4 days due to stress, stating his grandson is getting  in [place] in 1 month and patient is stressed about the travel, neck pain, and his inability to stay sober. Patient currently reports beginning to feel \"shaky inside\", anxious, and reports some recent instances of diarrhea. Denies hx withdrawal seizures. Denies history of formal substance abuse treatment in the past, stating his past attempts to get sober were to \"just quit\". Longest period of sobriety was 1.5 years. Patient denies hx other problem substance use. Reports dx depression and anxiety, and is active with a telehealth psychologist that he talks to once per month; states he missed his appointment last week. Not on any psychiatric medications. Patient verbalizes desire for alcohol detox at this time. SW to make referral when patient is medically cleared.   "

## 2024-07-11 NOTE — PROGRESS NOTES
Patient accepted to [facility] unit by Dr. Suarez. Nursing report number [phone].

## 2024-07-11 NOTE — ED PROVIDER NOTES
"    EMERGENCY DEPARTMENT ENCOUNTER      Pt Name: Brad Patino  MRN: [id number]  Birthdate 1955  Date of evaluation: 7/11/2024  ED Provider: Rae Welch DO     CHIEF COMPLAINT       Chief Complaint   Patient presents with    Depression     Patient coming in today for help with depression and alcohol abuse.  Patient states just has been feeling anxious lately and can not sleep.  Patient denies any thoughts of harming self or others.        HISTORY OF PRESENT ILLNESS    Brad Patino is a 69 y.o. who presents to the emergency department via EMS with complaints of alcohol abuse and depression.  He states he drinks 7-10 drinks daily.  Last drink was approximate 1 hour prior to arrival.  He states he has drank \"my entire life\" and has never had a period of sobriety.  He is uncertain if he is ever had any withdrawals or seizures from alcohol withdrawal previously.  He states he is significantly depressed and has been having difficulty sleeping as well.  He is interested in detox.    REVIEW OF SYSTEMS     Focused ROS performed and negative other than as listed in HPI    PAST MEDICAL HISTORY     Past Medical History:   Diagnosis Date    Abdominal pain 09/19/2023    Accidental fall 09/19/2023    Acute renal failure syndrome (CMS-HCC) 09/19/2023    Chest pain 09/19/2023    Clouded consciousness 09/19/2023    Displacement of cervical intervertebral disc without myelopathy 09/19/2023    Electrolyte imbalance 09/19/2023    Pain of lower extremity 09/19/2023       SURGICAL HISTORY       Past Surgical History:   Procedure Laterality Date    MR HEAD ANGIO WO IV CONTRAST  6/5/2017    MR HEAD ANGIO WO IV CONTRAST [facility]       CURRENT MEDICATIONS       Previous Medications    AMLODIPINE (NORVASC) 10 MG TABLET    Take 1 tablet (10 mg) by mouth once daily.    CELECOXIB (CELEBREX) 100 MG CAPSULE    Take 1 capsule (100 mg) by mouth once daily.    ESTAZOLAM (PROSOM) 2 MG TABLET    (Schedule IV Drug) Oral for " 30    FUROSEMIDE (LASIX) 20 MG TABLET    Take by mouth.    GABAPENTIN (NEURONTIN) 300 MG CAPSULE    Take 1 capsule (300 mg) by mouth 3 times a day.    LOSARTAN (COZAAR) 100 MG TABLET    Take 1 tablet (100 mg) by mouth once daily.    MAGNESIUM OXIDE (MAG-OX) 400 MG (241.3 MG MAGNESIUM) TABLET    Take by mouth.    METFORMIN (GLUCOPHAGE) 500 MG TABLET    Take 1 tablet (500 mg) by mouth 2 times a day with meals.    METOPROLOL SUCCINATE XL (TOPROL-XL) 50 MG 24 HR TABLET    Take 1 tablet (50 mg) by mouth once daily.    OMEPRAZOLE (PRILOSEC) 40 MG DR CAPSULE    Take 1 capsule (40 mg) by mouth once daily.    VENLAFAXINE 37.5 MG 24 HR TABLET    Take 2 tablets (75 mg) by mouth once daily with breakfast. take 1 tablet by mouth once daily WITH FOOD FOR 7 DAYS THEN TAKE 2 TABLETS DAILY       ALLERGIES     Celecoxib    FAMILY HISTORY     No family history on file.     SOCIAL HISTORY       Social History     Socioeconomic History    Marital status: Unknown   Tobacco Use    Smoking status: Every Day     Types: Cigarettes    Smokeless tobacco: Never   Substance and Sexual Activity    Alcohol use: Not Currently    Drug use: Defer    Sexual activity: Defer       PHYSICAL EXAM       ED Triage Vitals [07/11/24 1215]   Temperature Heart Rate Respirations BP   36.8 °C (98.2 °F) 72 18 142/69      Pulse Ox Temp Source Heart Rate Source Patient Position   95 % Oral Monitor Lying      BP Location FiO2 (%)     Right arm --        General: Appears clinically intoxicated, and no acute distress, alert  Head: Head atraumatic; normocephalic  Eyes: normal inspection; no icterus  ENT: mucosa moist without lesion  Neck: Normal inspection, no meningeal signs  Resp: Normal breath sounds, no wheeze or crackles; No respiratory distress  Chest Wall: no tenderness or deformity  Heart: Heart rate and rhythm regular; No Murmurs  Abdomen: Soft, Non-tender; No distention, guarding, rigidity, or rebound  MSK: Normal appearance; Moves all extremities; No Pedal  edema  Neuro: Alert; no focal deficits, moves all extremities  Psych: Mood and Affect normal  Skin: Color appropriate; warm; Dry    DIAGNOSTIC RESULTS   Lab and radiology results are independently interpreted unless noted below.  LABS:  Abnormal Labs Reviewed   CBC WITH AUTO DIFFERENTIAL - Abnormal; Notable for the following components:       Result Value    RBC 3.70 (*)     Hemoglobin 12.6 (*)     Hematocrit 35.3 (*)     MCH 34.1 (*)     Immature Granulocytes %, Automated 1.7 (*)     Lymphocytes Absolute 0.71 (*)     All other components within normal limits   COMPREHENSIVE METABOLIC PANEL - Abnormal; Notable for the following components:    Glucose 114 (*)     Sodium 123 (*)     Chloride 83 (*)     Urea Nitrogen 4 (*)     Alkaline Phosphatase 252 (*)     AST 67 (*)     ALT 48 (*)     All other components within normal limits   ACUTE TOXICOLOGY PANEL, BLOOD - Abnormal; Notable for the following components:    Alcohol 0.314 (*)     All other components within normal limits       All other labs were within normal range or not returned as of this dictation.    EKG:  Personally interpreted by Rae Welch,   1236: Normal sinus rhythm with a ventricular rate 70 normal axis and intervals no acute ischemic changes    EMERGENCY DEPARTMENT COURSE/MDM   Patient presents acutely intoxicated requesting detox and help with mental health.  Psychiatric workup is initiated.  He is agreeable with this plan of care.    Pt accepted to [facility]. Transferred in stable condition.    Diagnoses as of 07/11/24 1621   Alcohol abuse   Acute alcoholic intoxication without complication (CMS-[facility])       Meds Administered:  Medications   folic acid (Folvite) tablet 1 mg (1 mg oral Given 7/11/24 1339)   multivitamin with minerals 1 tablet (1 tablet oral Given 7/11/24 1339)   thiamine (Vitamin B-1) tablet 100 mg (100 mg oral Given 7/11/24 1339)   LORazepam (Ativan) injection 0.5 mg (has no administration in time range)     Or   LORazepam  (Ativan) injection 1 mg (has no administration in time range)     Or   LORazepam (Ativan) injection 2 mg (has no administration in time range)       PROCEDURES   Unless otherwise noted below, none  Procedures      FINAL IMPRESSION      1. Alcohol abuse    2. Acute alcoholic intoxication without complication (CMS-HCC)          DISPOSITION    Transfer To Another Facility 07/11/2024 04:20:07 PM        (Comment: Please note this report has been produced using speech recognition software and may contain errors related to that system including errors in grammar, punctuation, and spelling, as well as words and phrases that may be inappropriate.  If there are any questions or concerns please feel free to contact the dictating provider for clarification.)    Rae Welch DO (electronically signed)  Emergency Medicine Physician    History Limited by: Intoxication  Independent history obtained from: None  External records reviewed: None  Diagnostics interpreted by me: EKG - see my independent interpretation elsewhere in the chart  Discussions with other clinicians: Social Work    Chronic conditions impacting care: Hypertension  Social determinants of health affecting care: Alcoholism  Diagnostic tests considered but not performed: n/a  ED Medications managed:  Medications   folic acid (Folvite) tablet 1 mg (1 mg oral Given 7/11/24 1339)   multivitamin with minerals 1 tablet (1 tablet oral Given 7/11/24 1339)   thiamine (Vitamin B-1) tablet 100 mg (100 mg oral Given 7/11/24 1339)   LORazepam (Ativan) injection 0.5 mg (has no administration in time range)     Or   LORazepam (Ativan) injection 1 mg (has no administration in time range)     Or   LORazepam (Ativan) injection 2 mg (has no administration in time range)       Prescription drugs considered: None             Rae Welch DO  07/11/24 4310

## 2024-07-13 LAB
ATRIAL RATE: 70 BPM
P OFFSET: 199 MS
P ONSET: 154 MS
PR INTERVAL: 114 MS
Q ONSET: 211 MS
QRS COUNT: 12 BEATS
QRS DURATION: 94 MS
QT INTERVAL: 414 MS
QTC CALCULATION(BAZETT): 447 MS
QTC FREDERICIA: 436 MS
R AXIS: 39 DEGREES
T AXIS: 57 DEGREES
T OFFSET: 418 MS
VENTRICULAR RATE: 70 BPM

## 2024-07-14 ENCOUNTER — HOSPITAL ENCOUNTER (EMERGENCY)
Facility: HOSPITAL | Age: 69
Discharge: HOME | End: 2024-07-14
Attending: EMERGENCY MEDICINE
Payer: MEDICARE

## 2024-07-14 ENCOUNTER — APPOINTMENT (OUTPATIENT)
Dept: CARDIOLOGY | Facility: HOSPITAL | Age: 69
End: 2024-07-14
Payer: MEDICARE

## 2024-07-14 VITALS
OXYGEN SATURATION: 93 % | RESPIRATION RATE: 36 BRPM | WEIGHT: 175 LBS | BODY MASS INDEX: 27.47 KG/M2 | HEART RATE: 73 BPM | SYSTOLIC BLOOD PRESSURE: 184 MMHG | DIASTOLIC BLOOD PRESSURE: 81 MMHG | TEMPERATURE: 98.4 F | HEIGHT: 67 IN

## 2024-07-14 DIAGNOSIS — F10.930 ALCOHOL WITHDRAWAL SYNDROME WITHOUT COMPLICATION (MULTI): Primary | ICD-10-CM

## 2024-07-14 LAB
ALBUMIN SERPL BCP-MCNC: 3.6 G/DL (ref 3.4–5)
ALP SERPL-CCNC: 147 U/L (ref 33–136)
ALT SERPL W P-5'-P-CCNC: 23 U/L (ref 10–52)
AMPHETAMINES UR QL SCN: NORMAL
ANION GAP SERPL CALC-SCNC: 13 MMOL/L (ref 10–20)
APAP SERPL-MCNC: <10 UG/ML
AST SERPL W P-5'-P-CCNC: 30 U/L (ref 9–39)
BARBITURATES UR QL SCN: NORMAL
BASOPHILS # BLD AUTO: 0.03 X10*3/UL (ref 0–0.1)
BASOPHILS NFR BLD AUTO: 0.6 %
BENZODIAZ UR QL SCN: NORMAL
BILIRUB SERPL-MCNC: 0.7 MG/DL (ref 0–1.2)
BUN SERPL-MCNC: 12 MG/DL (ref 6–23)
BZE UR QL SCN: NORMAL
CALCIUM SERPL-MCNC: 8.6 MG/DL (ref 8.6–10.3)
CANNABINOIDS UR QL SCN: NORMAL
CHLORIDE SERPL-SCNC: 96 MMOL/L (ref 98–107)
CO2 SERPL-SCNC: 24 MMOL/L (ref 21–32)
CREAT SERPL-MCNC: 0.66 MG/DL (ref 0.5–1.3)
EGFRCR SERPLBLD CKD-EPI 2021: >90 ML/MIN/1.73M*2
EOSINOPHIL # BLD AUTO: 0.05 X10*3/UL (ref 0–0.7)
EOSINOPHIL NFR BLD AUTO: 1 %
ERYTHROCYTE [DISTWIDTH] IN BLOOD BY AUTOMATED COUNT: 13.7 % (ref 11.5–14.5)
ETHANOL SERPL-MCNC: <10 MG/DL
FENTANYL+NORFENTANYL UR QL SCN: NORMAL
GLUCOSE SERPL-MCNC: 117 MG/DL (ref 74–99)
HCT VFR BLD AUTO: 32.2 % (ref 41–52)
HGB BLD-MCNC: 11.4 G/DL (ref 13.5–17.5)
IMM GRANULOCYTES # BLD AUTO: 0.04 X10*3/UL (ref 0–0.7)
IMM GRANULOCYTES NFR BLD AUTO: 0.8 % (ref 0–0.9)
LYMPHOCYTES # BLD AUTO: 0.87 X10*3/UL (ref 1.2–4.8)
LYMPHOCYTES NFR BLD AUTO: 17.9 %
MCH RBC QN AUTO: 34.5 PG (ref 26–34)
MCHC RBC AUTO-ENTMCNC: 35.4 G/DL (ref 32–36)
MCV RBC AUTO: 98 FL (ref 80–100)
METHADONE UR QL SCN: NORMAL
MONOCYTES # BLD AUTO: 0.34 X10*3/UL (ref 0.1–1)
MONOCYTES NFR BLD AUTO: 7 %
NEUTROPHILS # BLD AUTO: 3.54 X10*3/UL (ref 1.2–7.7)
NEUTROPHILS NFR BLD AUTO: 72.7 %
NRBC BLD-RTO: 0 /100 WBCS (ref 0–0)
OPIATES UR QL SCN: NORMAL
OXYCODONE+OXYMORPHONE UR QL SCN: NORMAL
PCP UR QL SCN: NORMAL
PLATELET # BLD AUTO: 109 X10*3/UL (ref 150–450)
POTASSIUM SERPL-SCNC: 4.1 MMOL/L (ref 3.5–5.3)
PROT SERPL-MCNC: 5.8 G/DL (ref 6.4–8.2)
RBC # BLD AUTO: 3.3 X10*6/UL (ref 4.5–5.9)
SALICYLATES SERPL-MCNC: <3 MG/DL
SODIUM SERPL-SCNC: 129 MMOL/L (ref 136–145)
WBC # BLD AUTO: 4.9 X10*3/UL (ref 4.4–11.3)

## 2024-07-14 PROCEDURE — 96374 THER/PROPH/DIAG INJ IV PUSH: CPT

## 2024-07-14 PROCEDURE — 2500000002 HC RX 250 W HCPCS SELF ADMINISTERED DRUGS (ALT 637 FOR MEDICARE OP, ALT 636 FOR OP/ED): Performed by: EMERGENCY MEDICINE

## 2024-07-14 PROCEDURE — 36415 COLL VENOUS BLD VENIPUNCTURE: CPT | Performed by: EMERGENCY MEDICINE

## 2024-07-14 PROCEDURE — S4991 NICOTINE PATCH NONLEGEND: HCPCS | Performed by: EMERGENCY MEDICINE

## 2024-07-14 PROCEDURE — 80143 DRUG ASSAY ACETAMINOPHEN: CPT | Performed by: EMERGENCY MEDICINE

## 2024-07-14 PROCEDURE — 2500000004 HC RX 250 GENERAL PHARMACY W/ HCPCS (ALT 636 FOR OP/ED): Performed by: EMERGENCY MEDICINE

## 2024-07-14 PROCEDURE — 2500000001 HC RX 250 WO HCPCS SELF ADMINISTERED DRUGS (ALT 637 FOR MEDICARE OP): Performed by: EMERGENCY MEDICINE

## 2024-07-14 PROCEDURE — 85025 COMPLETE CBC W/AUTO DIFF WBC: CPT | Performed by: EMERGENCY MEDICINE

## 2024-07-14 PROCEDURE — 93005 ELECTROCARDIOGRAM TRACING: CPT

## 2024-07-14 PROCEDURE — 80053 COMPREHEN METABOLIC PANEL: CPT | Performed by: EMERGENCY MEDICINE

## 2024-07-14 PROCEDURE — 99284 EMERGENCY DEPT VISIT MOD MDM: CPT | Mod: 25

## 2024-07-14 PROCEDURE — 80307 DRUG TEST PRSMV CHEM ANLYZR: CPT | Performed by: EMERGENCY MEDICINE

## 2024-07-14 RX ORDER — FOLIC ACID 1 MG/1
1 TABLET ORAL DAILY
Status: DISCONTINUED | OUTPATIENT
Start: 2024-07-14 | End: 2024-07-14 | Stop reason: HOSPADM

## 2024-07-14 RX ORDER — LANOLIN ALCOHOL/MO/W.PET/CERES
100 CREAM (GRAM) TOPICAL DAILY
Status: DISCONTINUED | OUTPATIENT
Start: 2024-07-17 | End: 2024-07-14 | Stop reason: HOSPADM

## 2024-07-14 RX ORDER — CYCLOBENZAPRINE HCL 10 MG
10 TABLET ORAL ONCE
Status: DISCONTINUED | OUTPATIENT
Start: 2024-07-14 | End: 2024-07-14 | Stop reason: HOSPADM

## 2024-07-14 RX ORDER — IBUPROFEN 200 MG
1 TABLET ORAL DAILY
Status: DISCONTINUED | OUTPATIENT
Start: 2024-07-14 | End: 2024-07-14 | Stop reason: HOSPADM

## 2024-07-14 RX ORDER — MULTIVIT-MIN/IRON FUM/FOLIC AC 7.5 MG-4
1 TABLET ORAL DAILY
Status: DISCONTINUED | OUTPATIENT
Start: 2024-07-14 | End: 2024-07-14 | Stop reason: HOSPADM

## 2024-07-14 RX ORDER — THIAMINE HYDROCHLORIDE 100 MG/ML
100 INJECTION, SOLUTION INTRAMUSCULAR; INTRAVENOUS DAILY
Status: DISCONTINUED | OUTPATIENT
Start: 2024-07-14 | End: 2024-07-14 | Stop reason: HOSPADM

## 2024-07-14 RX ORDER — LORAZEPAM 0.5 MG/1
0.5 TABLET ORAL EVERY 2 HOUR PRN
Status: DISCONTINUED | OUTPATIENT
Start: 2024-07-14 | End: 2024-07-14 | Stop reason: HOSPADM

## 2024-07-14 RX ORDER — LORAZEPAM 1 MG/1
2 TABLET ORAL EVERY 2 HOUR PRN
Status: DISCONTINUED | OUTPATIENT
Start: 2024-07-14 | End: 2024-07-14 | Stop reason: HOSPADM

## 2024-07-14 RX ORDER — LORAZEPAM 1 MG/1
1 TABLET ORAL EVERY 2 HOUR PRN
Status: DISCONTINUED | OUTPATIENT
Start: 2024-07-14 | End: 2024-07-14 | Stop reason: HOSPADM

## 2024-07-14 ASSESSMENT — LIFESTYLE VARIABLES
AUDITORY DISTURBANCES: NOT PRESENT
TACTILE DISTURBANCES: VERY MILD ITCHING, PINS AND NEEDLES, BURNING OR NUMBNESS
TACTILE DISTURBANCES: VERY MILD ITCHING, PINS AND NEEDLES, BURNING OR NUMBNESS
ANXIETY: MODERATELY ANXIOUS, OR GUARDED, SO ANXIETY IS INFERRED
TREMOR: NO TREMOR
PULSE: 76
VISUAL DISTURBANCES: NOT PRESENT
TREMOR: 2
HEADACHE, FULLNESS IN HEAD: NOT PRESENT
AGITATION: MODERATELY FIDGETY AND RESTLESS
PAROXYSMAL SWEATS: NO SWEAT VISIBLE
TOTAL SCORE: 3
PULSE: 95
PAROXYSMAL SWEATS: NO SWEAT VISIBLE
ORIENTATION AND CLOUDING OF SENSORIUM: ORIENTED AND CAN DO SERIAL ADDITIONS
ANXIETY: NO ANXIETY, AT EASE
NAUSEA AND VOMITING: NO NAUSEA AND NO VOMITING
AGITATION: NORMAL ACTIVITY
NAUSEA AND VOMITING: NO NAUSEA AND NO VOMITING
BLOOD PRESSURE: 128/64
ORIENTATION AND CLOUDING OF SENSORIUM: ORIENTED AND CAN DO SERIAL ADDITIONS
ANXIETY: NO ANXIETY, AT EASE
HEADACHE, FULLNESS IN HEAD: NOT PRESENT
BLOOD PRESSURE: 157/75
NAUSEA AND VOMITING: NO NAUSEA AND NO VOMITING
TOTAL SCORE: 0
AUDITORY DISTURBANCES: NOT PRESENT
PAROXYSMAL SWEATS: NO SWEAT VISIBLE
AGITATION: NORMAL ACTIVITY
VISUAL DISTURBANCES: NOT PRESENT
TOTAL SCORE: 12
ORIENTATION AND CLOUDING OF SENSORIUM: ORIENTED AND CAN DO SERIAL ADDITIONS
TREMOR: 2
VISUAL DISTURBANCES: NOT PRESENT
AUDITORY DISTURBANCES: NOT PRESENT
HEADACHE, FULLNESS IN HEAD: VERY MILD

## 2024-07-14 ASSESSMENT — PAIN DESCRIPTION - LOCATION: LOCATION: NECK

## 2024-07-14 ASSESSMENT — PAIN SCALES - GENERAL
PAINLEVEL_OUTOF10: 8
PAINLEVEL_OUTOF10: 0 - NO PAIN

## 2024-07-14 ASSESSMENT — PAIN - FUNCTIONAL ASSESSMENT: PAIN_FUNCTIONAL_ASSESSMENT: 0-10

## 2024-07-14 NOTE — ED PROVIDER NOTES
HPI   Chief Complaint   Patient presents with    Illness     Patient complains of sleeping problems. Patient is from [facility] there for ETOH.       The patient presents with rehab concern for confusion.  They thought that he could be having a stroke.  He last drink alcohol 3 days ago.  He denies having any hallucinations but apparently he was wandering in the hallway looking for bathroom when his own room has of bathroom .  He is currently oriented x 3.  He denies any complaints.  He still feels somewhat shaky from alcohol.                        No data recorded       NIH Stroke Scale: 0             Patient History   Past Medical History:   Diagnosis Date    Abdominal pain 09/19/2023    Accidental fall 09/19/2023    Acute renal failure syndrome (CMS-HCC) 09/19/2023    Chest pain 09/19/2023    Clouded consciousness 09/19/2023    Displacement of cervical intervertebral disc without myelopathy 09/19/2023    Electrolyte imbalance 09/19/2023    Pain of lower extremity 09/19/2023     Past Surgical History:   Procedure Laterality Date    MR HEAD ANGIO WO IV CONTRAST  6/5/2017    MR HEAD ANGIO WO IV CONTRAST [facility]     No family history on file.  Social History     Tobacco Use    Smoking status: Every Day     Types: Cigarettes    Smokeless tobacco: Never   Substance Use Topics    Alcohol use: Not Currently     Comment: 10+ drinks daily last drink thursday    Drug use: Defer       Physical Exam   ED Triage Vitals [07/14/24 0317]   Temperature Heart Rate Respirations BP   36.9 °C (98.4 °F) 84 19 157/75      Pulse Ox Temp Source Heart Rate Source Patient Position   94 % Oral -- --      BP Location FiO2 (%)     -- --       Physical Exam  Vitals and nursing note reviewed.   Constitutional:       General: He is not in acute distress.     Appearance: He is well-developed.   HENT:      Head: Normocephalic and atraumatic.   Eyes:      Conjunctiva/sclera: Conjunctivae normal.   Cardiovascular:      Rate and  Rhythm: Normal rate and regular rhythm.      Heart sounds: No murmur heard.  Pulmonary:      Effort: Pulmonary effort is normal. No respiratory distress.      Breath sounds: Normal breath sounds.   Abdominal:      Palpations: Abdomen is soft.      Tenderness: There is no abdominal tenderness.   Musculoskeletal:         General: No swelling.      Cervical back: Neck supple.   Skin:     General: Skin is warm and dry.      Capillary Refill: Capillary refill takes less than 2 seconds.   Neurological:      Mental Status: He is alert.   Psychiatric:         Mood and Affect: Mood normal.       ED Course & MDM   Diagnoses as of 07/14/24 0523   Alcohol withdrawal syndrome without complication (Multi)       Medical Decision Making  The patient's NIH is 0.  He does not meet any criteria for CT scan at this time.  Feel this is more due to alcohol withdrawal.  He can be returned back to his facility.    EKG interpreted by myself.  Normal sinus rhythm at a rate of 73 bpm.  Normal intervals.  Normal axis.  No signs of acute ischemia.    Procedure  Procedures     Cong Gibbs MD  07/14/24 0655

## 2024-07-15 LAB
ATRIAL RATE: 73 BPM
P AXIS: 98 DEGREES
P OFFSET: 168 MS
P ONSET: 135 MS
PR INTERVAL: 144 MS
Q ONSET: 207 MS
QRS COUNT: 12 BEATS
QRS DURATION: 92 MS
QT INTERVAL: 412 MS
QTC CALCULATION(BAZETT): 453 MS
QTC FREDERICIA: 440 MS
R AXIS: 68 DEGREES
T AXIS: 51 DEGREES
T OFFSET: 413 MS
VENTRICULAR RATE: 73 BPM

## 2024-07-19 ENCOUNTER — HOSPITAL ENCOUNTER (EMERGENCY)
Facility: HOSPITAL | Age: 69
Discharge: HOME | End: 2024-07-20
Attending: STUDENT IN AN ORGANIZED HEALTH CARE EDUCATION/TRAINING PROGRAM
Payer: MEDICARE

## 2024-07-19 DIAGNOSIS — R41.3 MEMORY CHANGES: Primary | ICD-10-CM

## 2024-07-19 PROCEDURE — 99284 EMERGENCY DEPT VISIT MOD MDM: CPT

## 2024-07-19 ASSESSMENT — PAIN DESCRIPTION - LOCATION: LOCATION: NECK

## 2024-07-19 ASSESSMENT — PAIN - FUNCTIONAL ASSESSMENT: PAIN_FUNCTIONAL_ASSESSMENT: 0-10

## 2024-07-19 ASSESSMENT — PAIN SCALES - GENERAL: PAINLEVEL_OUTOF10: 8

## 2024-07-19 ASSESSMENT — PAIN DESCRIPTION - PAIN TYPE: TYPE: ACUTE PAIN

## 2024-07-20 ENCOUNTER — APPOINTMENT (OUTPATIENT)
Dept: RADIOLOGY | Facility: HOSPITAL | Age: 69
End: 2024-07-20
Payer: MEDICARE

## 2024-07-20 VITALS
HEART RATE: 67 BPM | RESPIRATION RATE: 18 BRPM | BODY MASS INDEX: 25.11 KG/M2 | WEIGHT: 160 LBS | DIASTOLIC BLOOD PRESSURE: 81 MMHG | TEMPERATURE: 98.6 F | HEIGHT: 67 IN | OXYGEN SATURATION: 100 % | SYSTOLIC BLOOD PRESSURE: 156 MMHG

## 2024-07-20 LAB
ALBUMIN SERPL-MCNC: 3.9 G/DL (ref 3.5–5)
ALP BLD-CCNC: 109 U/L (ref 35–125)
ALT SERPL-CCNC: 24 U/L (ref 5–40)
ANION GAP SERPL CALC-SCNC: 9 MMOL/L
AST SERPL-CCNC: 18 U/L (ref 5–40)
BASOPHILS # BLD AUTO: 0.03 X10*3/UL (ref 0–0.1)
BASOPHILS NFR BLD AUTO: 0.7 %
BILIRUB SERPL-MCNC: 0.3 MG/DL (ref 0.1–1.2)
BUN SERPL-MCNC: 11 MG/DL (ref 8–25)
CALCIUM SERPL-MCNC: 9 MG/DL (ref 8.5–10.4)
CHLORIDE SERPL-SCNC: 101 MMOL/L (ref 97–107)
CO2 SERPL-SCNC: 25 MMOL/L (ref 24–31)
CREAT SERPL-MCNC: 0.7 MG/DL (ref 0.4–1.6)
EGFRCR SERPLBLD CKD-EPI 2021: >90 ML/MIN/1.73M*2
EOSINOPHIL # BLD AUTO: 0.11 X10*3/UL (ref 0–0.7)
EOSINOPHIL NFR BLD AUTO: 2.5 %
ERYTHROCYTE [DISTWIDTH] IN BLOOD BY AUTOMATED COUNT: 13.3 % (ref 11.5–14.5)
ETHANOL SERPL-MCNC: <0.01 G/DL
GLUCOSE SERPL-MCNC: 138 MG/DL (ref 65–99)
HCT VFR BLD AUTO: 32.3 % (ref 41–52)
HGB BLD-MCNC: 11 G/DL (ref 13.5–17.5)
IMM GRANULOCYTES # BLD AUTO: 0.01 X10*3/UL (ref 0–0.7)
IMM GRANULOCYTES NFR BLD AUTO: 0.2 % (ref 0–0.9)
LIPASE SERPL-CCNC: 63 U/L (ref 16–63)
LYMPHOCYTES # BLD AUTO: 0.92 X10*3/UL (ref 1.2–4.8)
LYMPHOCYTES NFR BLD AUTO: 20.8 %
MCH RBC QN AUTO: 34 PG (ref 26–34)
MCHC RBC AUTO-ENTMCNC: 34.1 G/DL (ref 32–36)
MCV RBC AUTO: 100 FL (ref 80–100)
MONOCYTES # BLD AUTO: 0.62 X10*3/UL (ref 0.1–1)
MONOCYTES NFR BLD AUTO: 14 %
NEUTROPHILS # BLD AUTO: 2.73 X10*3/UL (ref 1.2–7.7)
NEUTROPHILS NFR BLD AUTO: 61.8 %
NRBC BLD-RTO: 0 /100 WBCS (ref 0–0)
PLATELET # BLD AUTO: 135 X10*3/UL (ref 150–450)
POTASSIUM SERPL-SCNC: 3.8 MMOL/L (ref 3.4–5.1)
PROT SERPL-MCNC: 6.1 G/DL (ref 5.9–7.9)
RBC # BLD AUTO: 3.24 X10*6/UL (ref 4.5–5.9)
SODIUM SERPL-SCNC: 135 MMOL/L (ref 133–145)
WBC # BLD AUTO: 4.4 X10*3/UL (ref 4.4–11.3)

## 2024-07-20 PROCEDURE — 36415 COLL VENOUS BLD VENIPUNCTURE: CPT | Performed by: STUDENT IN AN ORGANIZED HEALTH CARE EDUCATION/TRAINING PROGRAM

## 2024-07-20 PROCEDURE — 70450 CT HEAD/BRAIN W/O DYE: CPT | Performed by: RADIOLOGY

## 2024-07-20 PROCEDURE — 82077 ASSAY SPEC XCP UR&BREATH IA: CPT | Performed by: STUDENT IN AN ORGANIZED HEALTH CARE EDUCATION/TRAINING PROGRAM

## 2024-07-20 PROCEDURE — 70450 CT HEAD/BRAIN W/O DYE: CPT

## 2024-07-20 PROCEDURE — 84075 ASSAY ALKALINE PHOSPHATASE: CPT | Performed by: STUDENT IN AN ORGANIZED HEALTH CARE EDUCATION/TRAINING PROGRAM

## 2024-07-20 PROCEDURE — 85025 COMPLETE CBC W/AUTO DIFF WBC: CPT | Performed by: STUDENT IN AN ORGANIZED HEALTH CARE EDUCATION/TRAINING PROGRAM

## 2024-07-20 PROCEDURE — 83690 ASSAY OF LIPASE: CPT | Performed by: STUDENT IN AN ORGANIZED HEALTH CARE EDUCATION/TRAINING PROGRAM

## 2024-07-20 NOTE — DISCHARGE INSTRUCTIONS
You are seen in the emergency ferment today for memory loss.  Your imaging and electrolytes appear within normal limits.  I do recommend staying away from alcohol and continuing your daily vitamins.  This is possibly related to headaches.  You can take over-the-counter medication such as Excedrin, naproxen, Tylenol as needed for pain.  If you have any other concerns, he can return to Emergency Department for reevaluation.

## 2024-07-23 NOTE — ED PROVIDER NOTES
HPI   Chief Complaint   Patient presents with    Headache     Headache, neck pain, hypertension. Pt states he fell a week ago.        69 year old male presents with headache. Hx of alcohol abuse, now sober.  States he has had a headache for the past several days.  Gradual in onset, no vision changes.  Patient is concerned that his short term memory has been worse, states he will occasionally forget things which is new.  Also is concerned his blood pressure is high.  States he recently fell, unsure if this is related. No new falls in the last 24 hours, no falls or injuries prior to onset of headache.               Patient History   Past Medical History:   Diagnosis Date    Abdominal pain 09/19/2023    Accidental fall 09/19/2023    Acute renal failure syndrome (CMS-HCC) 09/19/2023    Chest pain 09/19/2023    Clouded consciousness 09/19/2023    Displacement of cervical intervertebral disc without myelopathy 09/19/2023    Electrolyte imbalance 09/19/2023    Pain of lower extremity 09/19/2023     Past Surgical History:   Procedure Laterality Date    MR HEAD ANGIO WO IV CONTRAST  6/5/2017    MR HEAD ANGIO WO IV CONTRAST [facility]     No family history on file.  Social History     Tobacco Use    Smoking status: Every Day     Types: Cigarettes    Smokeless tobacco: Never   Substance Use Topics    Alcohol use: Not Currently     Comment: 10+ drinks daily last drink thursday    Drug use: Defer       Physical Exam   ED Triage Vitals   Temperature Heart Rate Respirations BP   07/19/24 2259 07/19/24 2259 07/19/24 2259 07/19/24 2259   37 °C (98.6 °F) 79 16 169/71      Pulse Ox Temp Source Heart Rate Source Patient Position   07/19/24 2259 07/19/24 2259 07/20/24 0334 07/19/24 2259   100 % Temporal Monitor Sitting      BP Location FiO2 (%)     07/19/24 2259 --     Left arm        Physical Exam  Vitals and nursing note reviewed.   Constitutional:       General: He is not in acute distress.     Appearance: He is not  ill-appearing.   HENT:      Head: Normocephalic and atraumatic.      Mouth/Throat:      Mouth: Mucous membranes are moist.      Pharynx: Oropharynx is clear.   Eyes:      Extraocular Movements: Extraocular movements intact.      Conjunctiva/sclera: Conjunctivae normal.      Pupils: Pupils are equal, round, and reactive to light.   Cardiovascular:      Rate and Rhythm: Normal rate and regular rhythm.   Pulmonary:      Effort: Pulmonary effort is normal. No respiratory distress.      Breath sounds: Normal breath sounds.   Abdominal:      General: There is no distension.      Palpations: Abdomen is soft.      Tenderness: There is no abdominal tenderness.   Musculoskeletal:         General: No swelling or deformity. Normal range of motion.      Cervical back: Normal range of motion and neck supple.   Skin:     General: Skin is warm and dry.      Capillary Refill: Capillary refill takes less than 2 seconds.   Neurological:      General: No focal deficit present.      Mental Status: He is alert and oriented to person, place, and time. Mental status is at baseline.      Cranial Nerves: Cranial nerves 2-12 are intact.      Sensory: Sensation is intact.      Motor: Motor function is intact. No pronator drift.      Coordination: Finger-Nose-Finger Test and Heel to Shin Test normal.      Gait: Gait is intact.   Psychiatric:         Mood and Affect: Mood normal.         Behavior: Behavior normal.           ED Course & MDM   Diagnoses as of 07/23/24 1402   Memory changes                       No data recorded                      Medical Decision Making  69 y.o. male presents with headache.  I have considered the following diagnosis in the evaluation of this patient: migraine, tension headache, subarchanoid hemorrhage, intracranial hemorrhage, temporal arteritis, vertebral dissection, carotid dissection, infection.  Patient has no fevers, neuro deficits/AMS/change in vision, nuchal rigidity or neck pain, and headache not worst  of life which is reassuring, imaging unremarkable.  Labs without significant abnormalities.  No neck pain, no recent spinal manipulation, normal neuro exam.  No new visual symptoms, no tenderness/nodularity of temporal area, no jaw claudication, no vision loss/amaurosis fugax, less likely temporal arteritis. Patient's memory changes may be related to many years of alcohol abuse. Alert and oriented x3, able to follow commands.  Symptomatic improvement after medication administration.  Patient discharged with return precautions and recommended outpatient follow up.           Procedure  Procedures     Hina Fisher MD  07/23/24 0558

## 2024-07-26 ENCOUNTER — HOSPITAL ENCOUNTER (EMERGENCY)
Facility: HOSPITAL | Age: 69
Discharge: HOME | End: 2024-07-27
Attending: STUDENT IN AN ORGANIZED HEALTH CARE EDUCATION/TRAINING PROGRAM
Payer: MEDICARE

## 2024-07-26 ENCOUNTER — APPOINTMENT (OUTPATIENT)
Dept: CARDIOLOGY | Facility: HOSPITAL | Age: 69
End: 2024-07-26
Payer: MEDICARE

## 2024-07-26 ENCOUNTER — APPOINTMENT (OUTPATIENT)
Dept: RADIOLOGY | Facility: HOSPITAL | Age: 69
End: 2024-07-26
Payer: MEDICARE

## 2024-07-26 DIAGNOSIS — G43.001 MIGRAINE WITHOUT AURA AND WITH STATUS MIGRAINOSUS, NOT INTRACTABLE: Primary | ICD-10-CM

## 2024-07-26 LAB
ALBUMIN SERPL-MCNC: 3.8 G/DL (ref 3.5–5)
ALP BLD-CCNC: 87 U/L (ref 35–125)
ALT SERPL-CCNC: 31 U/L (ref 5–40)
ANION GAP SERPL CALC-SCNC: 13 MMOL/L
AST SERPL-CCNC: 27 U/L (ref 5–40)
BASOPHILS # BLD AUTO: 0.04 X10*3/UL (ref 0–0.1)
BASOPHILS NFR BLD AUTO: 0.6 %
BILIRUB SERPL-MCNC: 0.3 MG/DL (ref 0.1–1.2)
BUN SERPL-MCNC: 7 MG/DL (ref 8–25)
CALCIUM SERPL-MCNC: 9.2 MG/DL (ref 8.5–10.4)
CHLORIDE SERPL-SCNC: 98 MMOL/L (ref 97–107)
CO2 SERPL-SCNC: 25 MMOL/L (ref 24–31)
CREAT SERPL-MCNC: 0.8 MG/DL (ref 0.4–1.6)
EGFRCR SERPLBLD CKD-EPI 2021: >90 ML/MIN/1.73M*2
EOSINOPHIL # BLD AUTO: 0.17 X10*3/UL (ref 0–0.7)
EOSINOPHIL NFR BLD AUTO: 2.6 %
ERYTHROCYTE [DISTWIDTH] IN BLOOD BY AUTOMATED COUNT: 13.2 % (ref 11.5–14.5)
GLUCOSE SERPL-MCNC: 108 MG/DL (ref 65–99)
HCT VFR BLD AUTO: 36 % (ref 41–52)
HGB BLD-MCNC: 12.2 G/DL (ref 13.5–17.5)
IMM GRANULOCYTES # BLD AUTO: 0.02 X10*3/UL (ref 0–0.7)
IMM GRANULOCYTES NFR BLD AUTO: 0.3 % (ref 0–0.9)
LYMPHOCYTES # BLD AUTO: 1.18 X10*3/UL (ref 1.2–4.8)
LYMPHOCYTES NFR BLD AUTO: 18.4 %
MCH RBC QN AUTO: 33.7 PG (ref 26–34)
MCHC RBC AUTO-ENTMCNC: 33.9 G/DL (ref 32–36)
MCV RBC AUTO: 99 FL (ref 80–100)
MONOCYTES # BLD AUTO: 0.4 X10*3/UL (ref 0.1–1)
MONOCYTES NFR BLD AUTO: 6.2 %
NEUTROPHILS # BLD AUTO: 4.62 X10*3/UL (ref 1.2–7.7)
NEUTROPHILS NFR BLD AUTO: 71.9 %
NRBC BLD-RTO: 0 /100 WBCS (ref 0–0)
PLATELET # BLD AUTO: 257 X10*3/UL (ref 150–450)
POTASSIUM SERPL-SCNC: 4.5 MMOL/L (ref 3.4–5.1)
PROT SERPL-MCNC: 6.9 G/DL (ref 5.9–7.9)
RBC # BLD AUTO: 3.62 X10*6/UL (ref 4.5–5.9)
SODIUM SERPL-SCNC: 136 MMOL/L (ref 133–145)
TROPONIN T SERPL-MCNC: 15 NG/L
TROPONIN T SERPL-MCNC: 16 NG/L
WBC # BLD AUTO: 6.4 X10*3/UL (ref 4.4–11.3)

## 2024-07-26 PROCEDURE — 85025 COMPLETE CBC W/AUTO DIFF WBC: CPT | Performed by: EMERGENCY MEDICINE

## 2024-07-26 PROCEDURE — 84075 ASSAY ALKALINE PHOSPHATASE: CPT | Performed by: EMERGENCY MEDICINE

## 2024-07-26 PROCEDURE — 96361 HYDRATE IV INFUSION ADD-ON: CPT | Performed by: STUDENT IN AN ORGANIZED HEALTH CARE EDUCATION/TRAINING PROGRAM

## 2024-07-26 PROCEDURE — 93005 ELECTROCARDIOGRAM TRACING: CPT

## 2024-07-26 PROCEDURE — 80053 COMPREHEN METABOLIC PANEL: CPT | Performed by: STUDENT IN AN ORGANIZED HEALTH CARE EDUCATION/TRAINING PROGRAM

## 2024-07-26 PROCEDURE — 85025 COMPLETE CBC W/AUTO DIFF WBC: CPT | Performed by: STUDENT IN AN ORGANIZED HEALTH CARE EDUCATION/TRAINING PROGRAM

## 2024-07-26 PROCEDURE — 70450 CT HEAD/BRAIN W/O DYE: CPT | Performed by: RADIOLOGY

## 2024-07-26 PROCEDURE — 36415 COLL VENOUS BLD VENIPUNCTURE: CPT | Performed by: STUDENT IN AN ORGANIZED HEALTH CARE EDUCATION/TRAINING PROGRAM

## 2024-07-26 PROCEDURE — 84484 ASSAY OF TROPONIN QUANT: CPT | Performed by: STUDENT IN AN ORGANIZED HEALTH CARE EDUCATION/TRAINING PROGRAM

## 2024-07-26 PROCEDURE — 2500000004 HC RX 250 GENERAL PHARMACY W/ HCPCS (ALT 636 FOR OP/ED): Performed by: STUDENT IN AN ORGANIZED HEALTH CARE EDUCATION/TRAINING PROGRAM

## 2024-07-26 PROCEDURE — 36415 COLL VENOUS BLD VENIPUNCTURE: CPT | Performed by: EMERGENCY MEDICINE

## 2024-07-26 PROCEDURE — 99285 EMERGENCY DEPT VISIT HI MDM: CPT | Performed by: STUDENT IN AN ORGANIZED HEALTH CARE EDUCATION/TRAINING PROGRAM

## 2024-07-26 PROCEDURE — 96374 THER/PROPH/DIAG INJ IV PUSH: CPT | Performed by: STUDENT IN AN ORGANIZED HEALTH CARE EDUCATION/TRAINING PROGRAM

## 2024-07-26 PROCEDURE — 82947 ASSAY GLUCOSE BLOOD QUANT: CPT | Mod: 59

## 2024-07-26 PROCEDURE — 70450 CT HEAD/BRAIN W/O DYE: CPT

## 2024-07-26 PROCEDURE — 96375 TX/PRO/DX INJ NEW DRUG ADDON: CPT | Performed by: STUDENT IN AN ORGANIZED HEALTH CARE EDUCATION/TRAINING PROGRAM

## 2024-07-26 PROCEDURE — 84484 ASSAY OF TROPONIN QUANT: CPT | Performed by: EMERGENCY MEDICINE

## 2024-07-26 RX ORDER — DIPHENHYDRAMINE HYDROCHLORIDE 50 MG/ML
25 INJECTION INTRAMUSCULAR; INTRAVENOUS ONCE
Status: COMPLETED | OUTPATIENT
Start: 2024-07-26 | End: 2024-07-26

## 2024-07-26 RX ORDER — METOCLOPRAMIDE HYDROCHLORIDE 5 MG/ML
10 INJECTION INTRAMUSCULAR; INTRAVENOUS ONCE
Status: COMPLETED | OUTPATIENT
Start: 2024-07-26 | End: 2024-07-26

## 2024-07-26 ASSESSMENT — COLUMBIA-SUICIDE SEVERITY RATING SCALE - C-SSRS
1. IN THE PAST MONTH, HAVE YOU WISHED YOU WERE DEAD OR WISHED YOU COULD GO TO SLEEP AND NOT WAKE UP?: NO
6. HAVE YOU EVER DONE ANYTHING, STARTED TO DO ANYTHING, OR PREPARED TO DO ANYTHING TO END YOUR LIFE?: NO
2. HAVE YOU ACTUALLY HAD ANY THOUGHTS OF KILLING YOURSELF?: NO

## 2024-07-26 ASSESSMENT — PAIN - FUNCTIONAL ASSESSMENT: PAIN_FUNCTIONAL_ASSESSMENT: 0-10

## 2024-07-26 ASSESSMENT — PAIN SCALES - GENERAL: PAINLEVEL_OUTOF10: 7

## 2024-07-27 VITALS
SYSTOLIC BLOOD PRESSURE: 158 MMHG | HEART RATE: 74 BPM | WEIGHT: 180 LBS | BODY MASS INDEX: 28.25 KG/M2 | TEMPERATURE: 99 F | HEIGHT: 67 IN | OXYGEN SATURATION: 97 % | DIASTOLIC BLOOD PRESSURE: 82 MMHG | RESPIRATION RATE: 16 BRPM

## 2024-07-27 RX ORDER — BUTALBITAL, ACETAMINOPHEN AND CAFFEINE 50; 325; 40 MG/1; MG/1; MG/1
1 TABLET ORAL 2 TIMES DAILY PRN
Qty: 6 TABLET | Refills: 0 | Status: SHIPPED | OUTPATIENT
Start: 2024-07-27 | End: 2024-07-30

## 2024-07-27 RX ORDER — BUTALBITAL, ACETAMINOPHEN AND CAFFEINE 50; 325; 40 MG/1; MG/1; MG/1
1 TABLET ORAL 2 TIMES DAILY PRN
Qty: 6 TABLET | Refills: 0 | Status: SHIPPED | OUTPATIENT
Start: 2024-07-27 | End: 2024-07-27

## 2024-07-27 NOTE — ED TRIAGE NOTES
Pt arrives to ED with c/o HA that has been ongoing since 7/11/2024. Pt states he was recently admitted to rehab facility for etoh. States he has not had etoh since 7/11. States he was sent to the ED from rehab for confusion and HA. Has been seen multiple times for HA. States nothing has been able to help. Denies photophobia, nausea/vomiting, numbness/tingling. Denies CP. Upon triage, pt started to complain of dizziness and stated he was going to pass out. Glucose checked which was 110. EKG completed and given to provider.

## 2024-07-27 NOTE — DISCHARGE INSTRUCTIONS
Continue taking your medications at home as needed, return to the ED for any new or concerning symptoms you feel requires emergent evaluation including infectious signs or symptoms like fevers, neck stiffness with worsening headache, confusion which may be signs of meningitis, headache with any new neurologic symptoms like numbness, weakness or paralysis, vision loss, slurred speech, difficulty speaking or swallowing which would require additional evaluation in the emergency department and may be signs of stroke.  Otherwise, you can follow-up with your primary doctor in case of persistent mild symptoms for further treatment recommendations.    You were prescribed a few tablets of Fioricet.  You should not drive, lift, operate heavy machinery, or yourself in any position where drowsiness could cause harm to yourself or others while taking this medication.  Follow-up with your neurologist for further recommendations on managing your headaches outside of the hospital.

## 2024-07-27 NOTE — ED PROVIDER NOTES
HPI   Chief Complaint   Patient presents with    Headache       This is a 69-year-old male with a past medical history of hypertension, type 2 diabetes, anxiety and depression, alcohol abuse presenting the ED today for evaluation of headache.  He states that since the 11th of this month, 15 days ago, he had a severe headache that will not go away.  He did see his neurologist Dr. Spears and was prescribed a medication.  He states that he thinks this may have worked for his headache but made him feel too groggy and drowsy and did not want to take it anymore.  He has not followed up again with her since then.  He denies any associated photophobia or phonophobia, nausea or vomiting, neck pain or stiffness.  No fevers.  He has had issues with his memory since admission to rehab for his alcohol abuse, he reports to me that he does not think this is getting any better or worse.        History provided by:  Patient and medical records   used: No            Patient History   Past Medical History:   Diagnosis Date    Abdominal pain 09/19/2023    Accidental fall 09/19/2023    Acute renal failure syndrome (CMS-HCC) 09/19/2023    Chest pain 09/19/2023    Clouded consciousness 09/19/2023    Displacement of cervical intervertebral disc without myelopathy 09/19/2023    Electrolyte imbalance 09/19/2023    Pain of lower extremity 09/19/2023     Past Surgical History:   Procedure Laterality Date    MR HEAD ANGIO WO IV CONTRAST  6/5/2017    MR HEAD ANGIO WO IV CONTRAST [facility]     No family history on file.  Social History     Tobacco Use    Smoking status: Every Day     Types: Cigarettes    Smokeless tobacco: Never   Substance Use Topics    Alcohol use: Not Currently     Comment: 10+ drinks daily last drink thursday    Drug use: Defer       Physical Exam   ED Triage Vitals   Temperature Heart Rate Respirations BP   07/26/24 2047 07/26/24 2047 07/26/24 2047 07/26/24 2047   37.2 °C (99 °F) 72 16 161/83       Pulse Ox Temp Source Heart Rate Source Patient Position   07/26/24 2047 07/26/24 2047 07/27/24 0052 07/27/24 0052   97 % Temporal Monitor Lying      BP Location FiO2 (%)     07/27/24 0052 --     Left arm        Physical Exam  General: well developed, well nourished 69-year-old male who is awake and alert, in no apparent distress, awake and alert, oriented x 4.  Eyes: sclera clear bilaterally, PERRL, EOMI  HENT: normocephalic, atraumatic. Pharynx without erythema or exudates, uvula midline.  ROM of the neck is fully intact to flexion extension, rotation bilaterally.  No meningismus.  CV: regular rate and rhythm, no murmur, no gallops, or rubs.   Resp: clear to ascultation bilaterally, no wheezes, rales, or rhonchi  GI: abdomen soft, nontender without rigidity or guarding, no peritoneal signs, abdomen is nondistended, no masses palpated  MSK: No midline spinal tenderness, strength +5/5 to upper and lower extremities bilaterally, no swelling of the extremities.  Neuro: no focal deficits, CN2-12 intact. Sensation fully intact.  NIHSS 0.  Psych: appropriate mood and affect, cooperative with exam  Skin: warm, dry, without evidence of rash or abrasions        ED Course & MDM   ED Course as of 07/27/24 1204   Fri Jul 26, 2024 2100 EKG personally interpreted by me performed at 2058  Normal sinus rhythm with ventricular rate 68 normal axis and intervals no acute ischemic changes [EF]      ED Course User Index  [EF] Rae Welch DO         Diagnoses as of 07/27/24 1204   Migraine without aura and with status migrainosus, not intractable                       [place] Coma Scale Score: 15                        Medical Decision Making  He is in no acute distress here, he complains of severe headache which is circumferential.  He has had similar headaches before and has been diagnosed with migraines by his neurologist per his report, he also reports that he had an MRI since that headaches began which was unremarkable.  I  was not able to find this result in the EMR.  He is hypertensive, vitals otherwise normal in the ED.  He has no infectious signs or symptoms, no meningismus, no neck pain or stiffness to suggest meningitis.  I did repeat head CT due to the intractable nature of his headache which was unremarkable.  It shows only age-related changes.  The remainder of his workup was unremarkable in ED.  He does report lightheadedness and possible near syncope, EKG is unremarkable and troponin is flat x 2 values, there is no evidence of evolving cardiac ischemia.    Patient does report that he had some memory loss in the short-term since before he was sent to rehab for his alcohol abuse, he has not noticed any significant difference in this recently.  I suspect this is due to his heavy alcohol use in the past.  He has no focal neurologic does not on exam to suggest stroke.  No indication for further neurologic workup in the emergency department given complete resolution of his headache with a migraine cocktail.  I did prescribe him some medication to use as needed in case of severe headache at home and he is follow-up with a neurologist to discuss better options for his headache treatment as an outpatient.  He is agreeable to plan.  He was discharged in improved condition without any ongoing symptoms.    CT head wo IV contrast   Final Result   No acute intracranial hemorrhage, mass effect or midline shift.        Nonspecific scattered white matter hypodensities favored to represent   sequela of small vessel ischemia.                  MACRO:   None.        Signed by: Evan Finkelstein 7/26/2024 11:57 PM   Dictation workstation:   [id number]        Labs Reviewed   COMPREHENSIVE METABOLIC PANEL - Abnormal       Result Value    Glucose 108 (*)     Sodium 136      Potassium 4.5      Chloride 98      Bicarbonate 25      Urea Nitrogen 7 (*)     Creatinine 0.80      eGFR >90      Calcium 9.2      Albumin 3.8      Alkaline Phosphatase 87       Total Protein 6.9      AST 27      Bilirubin, Total 0.3      ALT 31      Anion Gap 13     CBC WITH AUTO DIFFERENTIAL - Abnormal    WBC 6.4      nRBC 0.0      RBC 3.62 (*)     Hemoglobin 12.2 (*)     Hematocrit 36.0 (*)     MCV 99      MCH 33.7      MCHC 33.9      RDW 13.2      Platelets 257      Neutrophils % 71.9      Immature Granulocytes %, Automated 0.3      Lymphocytes % 18.4      Monocytes % 6.2      Eosinophils % 2.6      Basophils % 0.6      Neutrophils Absolute 4.62      Immature Granulocytes Absolute, Automated 0.02      Lymphocytes Absolute 1.18 (*)     Monocytes Absolute 0.40      Eosinophils Absolute 0.17      Basophils Absolute 0.04     SERIAL TROPONIN, INITIAL ([facility]) - Abnormal    Troponin T, High Sensitivity 15 (*)    SERIAL TROPONIN,  2 HOUR ([facility]) - Abnormal    Troponin T, High Sensitivity 16 (*)    TROPONIN T SERIES, HIGH SENSITIVITY (0, 2 HR, 6 HR)    Narrative:     The following orders were created for panel order Troponin T Series, High Sensitivity (0, 2HR, 6HR).  Procedure                               Abnormality         Status                     ---------                               -----------         ------                     Serial Troponin, Initial...[[id number]]  Abnormal            Final result               Serial Troponin, 2 Hour ...[[id number]]  Abnormal            Final result               Serial Troponin, 6 Hour ...[[id number]]                                                   Please view results for these tests on the individual orders.   SERIAL TROPONIN, 6 HOUR ([facility])         Procedure  Procedures     Chucky Lemus DO  07/27/24 7974

## 2024-07-29 LAB
ATRIAL RATE: 68 BPM
GLUCOSE BLD MANUAL STRIP-MCNC: 110 MG/DL (ref 74–99)
P AXIS: 54 DEGREES
P OFFSET: 203 MS
P ONSET: 150 MS
PR INTERVAL: 144 MS
Q ONSET: 222 MS
QRS COUNT: 12 BEATS
QRS DURATION: 88 MS
QT INTERVAL: 400 MS
QTC CALCULATION(BAZETT): 425 MS
QTC FREDERICIA: 417 MS
R AXIS: 62 DEGREES
T AXIS: 65 DEGREES
T OFFSET: 422 MS
VENTRICULAR RATE: 68 BPM

## 2024-08-12 ENCOUNTER — APPOINTMENT (OUTPATIENT)
Dept: ORTHOPEDIC SURGERY | Facility: CLINIC | Age: 69
End: 2024-08-12
Payer: COMMERCIAL

## 2024-08-19 ENCOUNTER — HOSPITAL ENCOUNTER (OUTPATIENT)
Dept: RADIOLOGY | Facility: CLINIC | Age: 69
Discharge: HOME | End: 2024-08-19
Payer: MEDICARE

## 2024-08-19 ENCOUNTER — APPOINTMENT (OUTPATIENT)
Dept: ORTHOPEDIC SURGERY | Facility: CLINIC | Age: 69
End: 2024-08-19
Payer: COMMERCIAL

## 2024-08-19 DIAGNOSIS — S43.92XA SPRAIN OF LEFT SHOULDER GIRDLE, INITIAL ENCOUNTER: Primary | ICD-10-CM

## 2024-08-19 DIAGNOSIS — S43.92XA SPRAIN OF LEFT SHOULDER GIRDLE, INITIAL ENCOUNTER: ICD-10-CM

## 2024-08-19 PROCEDURE — 73030 X-RAY EXAM OF SHOULDER: CPT | Mod: LEFT SIDE | Performed by: RADIOLOGY

## 2024-08-19 PROCEDURE — 99213 OFFICE O/P EST LOW 20 MIN: CPT | Performed by: ORTHOPAEDIC SURGERY

## 2024-08-19 PROCEDURE — 73030 X-RAY EXAM OF SHOULDER: CPT | Mod: LT

## 2024-08-19 ASSESSMENT — PAIN SCALES - GENERAL: PAINLEVEL_OUTOF10: 8

## 2024-08-19 ASSESSMENT — PAIN - FUNCTIONAL ASSESSMENT: PAIN_FUNCTIONAL_ASSESSMENT: 0-10

## 2024-08-19 NOTE — PROGRESS NOTES
Reason for Appointment  Chief Complaint   Patient presents with    Left Shoulder - Follow-up     History of Present Illness  Patient is a 69 y.o. male here today for follow-up evaluation of left shoulder pain.  Patient has concomitant neck issues that are significant.  He has not had an injection in the shoulder in some time and he has been having increasing pain deep in the shoulder that is worse with lifting and better with rest.  Previous injection has helped greatly but it has been quite sometime.  No falls or injuries no changes in his past medical history allergies or medications still getting neck pain with some on and off radicular symptoms.     Past Medical History:   Diagnosis Date    Abdominal pain 09/19/2023    Accidental fall 09/19/2023    Acute renal failure syndrome (CMS-HCC) 09/19/2023    Chest pain 09/19/2023    Clouded consciousness 09/19/2023    Displacement of cervical intervertebral disc without myelopathy 09/19/2023    Electrolyte imbalance 09/19/2023    Pain of lower extremity 09/19/2023       Past Surgical History:   Procedure Laterality Date    MR HEAD ANGIO WO IV CONTRAST  6/5/2017    MR HEAD ANGIO WO IV CONTRAST [facility]PATIENT [facility]GAC[facility]       Medication Documentation Review Audit       Reviewed by Anup Mcconnell MD (Physician) on 08/19/24 at 1902      Medication Order Taking? Sig Documenting Provider Last Dose Status   amLODIPine (Norvasc) 10 mg tablet [id number] Yes Take 1 tablet (10 mg) by mouth once daily. Historical Provider, MD Taking Active   celecoxib (CeleBREX) 100 mg capsule [id number] Yes Take 1 capsule (100 mg) by mouth once daily. Historical Provider, MD Taking Active   estazolam (Prosom) 2 mg tablet [id number] Yes (Schedule IV Drug) Oral for 30 Historical Provider, MD Taking Active   furosemide (Lasix) 20 mg tablet [id number] Yes Take by mouth. Historical Provider, MD Taking Active   gabapentin (Neurontin) 300 mg capsule [id number] Yes Take 1 capsule (300 mg) by mouth 3 times a day.  Historical Provider, MD Taking Active   losartan (Cozaar) 100 mg tablet [id number] Yes Take 1 tablet (100 mg) by mouth once daily. Historical Provider, MD Taking Active   magnesium oxide (Mag-Ox) 400 mg (241.3 mg magnesium) tablet [id number] Yes Take by mouth. Historical Provider, MD Taking Active   metFORMIN (Glucophage) 500 mg tablet [id number] Yes Take 1 tablet (500 mg) by mouth 2 times daily (morning and late afternoon). Historical Provider, MD Taking Active   metoprolol succinate XL (Toprol-XL) 50 mg 24 hr tablet [id number] Yes Take 1 tablet (50 mg) by mouth once daily. Historical Provider, MD Taking Active   omeprazole (PriLOSEC) 40 mg DR capsule [id number] Yes Take 1 capsule (40 mg) by mouth once daily. Historical Provider, MD Taking Active   venlafaxine 37.5 mg 24 hr tablet [id number] Yes Take 2 tablets (75 mg) by mouth once daily with breakfast. take 1 tablet by mouth once daily WITH FOOD FOR 7 DAYS THEN TAKE 2 TABLETS DAILY Historical Provider, MD Taking Active                    Allergies   Allergen Reactions    Celecoxib Hives    Hydroxyzine Unknown     May have had a seizure    Aspirin Hives     Regular aspirin upsets the stomach. 81 mg is ok       Review of Systems  Unchanged  Exam   On examination decreased cervical range of motion with some stiffness with some left periscapular tenderness left shoulder shows tenderness over the joint line and a positive impingement sign but overall cuff strength is maintained mild biceps tenderness good distal pulses and sensation in the left upper extremity  Assessment   Encounter Diagnosis   Name Primary?    Sprain of left shoulder girdle, initial encounter Yes       Plan   X-rays show some mild arthritic change throughout the shoulder and what appears to be some postoperative changes, at this juncture with his entire history, a joint injection gives him the best chance that pain improvement and improved function while avoiding any risk of recurrent surgical  intervention, I would place a C9 for left shoulder joint injection

## 2024-10-07 ENCOUNTER — APPOINTMENT (OUTPATIENT)
Dept: ORTHOPEDIC SURGERY | Facility: CLINIC | Age: 69
End: 2024-10-07
Payer: COMMERCIAL

## 2024-10-07 DIAGNOSIS — S43.492A OTHER SPRAIN OF LEFT SHOULDER JOINT, INITIAL ENCOUNTER: Primary | ICD-10-CM

## 2024-10-07 DIAGNOSIS — S43.92XA: ICD-10-CM

## 2024-10-07 PROCEDURE — 99213 OFFICE O/P EST LOW 20 MIN: CPT | Performed by: ORTHOPAEDIC SURGERY

## 2024-10-07 ASSESSMENT — ENCOUNTER SYMPTOMS
FEVER: 0
FATIGUE: 0
CHILLS: 0
WHEEZING: 0
ARTHRALGIAS: 1
NECK PAIN: 1
SHORTNESS OF BREATH: 0

## 2024-10-07 ASSESSMENT — PAIN - FUNCTIONAL ASSESSMENT: PAIN_FUNCTIONAL_ASSESSMENT: 0-10

## 2024-10-07 ASSESSMENT — PAIN SCALES - GENERAL: PAINLEVEL_OUTOF10: 0 - NO PAIN

## 2024-10-07 NOTE — PROGRESS NOTES
Reason for Appointment  Chief Complaint   Patient presents with    Left Shoulder - Follow-up     History of Present Illness  Patient is a 69 y.o. male here today for follow-up evaluation of the left shoulder pain. We last saw him on 8/19/24 . Shoulder pain continues, appears injection for left shoulder has been denied. He sates today he has done well with trigger point massages in the neck for cervical issues. No recent falls or injuries. No other changes in past medical history, allergies, or medications.      Past Medical History:   Diagnosis Date    Abdominal pain 09/19/2023    Accidental fall 09/19/2023    Acute renal failure syndrome (CMS-HCC) 09/19/2023    Chest pain 09/19/2023    Clouded consciousness 09/19/2023    Displacement of cervical intervertebral disc without myelopathy 09/19/2023    Electrolyte imbalance 09/19/2023    Pain of lower extremity 09/19/2023       Past Surgical History:   Procedure Laterality Date    MR HEAD ANGIO WO IV CONTRAST  6/5/2017    MR HEAD ANGIO WO IV CONTRAST [facility]       Medication Documentation Review Audit       Reviewed by Anup Mcconnell MD (Physician) on 10/07/24 at 0900      Medication Order Taking? Sig Documenting Provider Last Dose Status   amLODIPine (Norvasc) 10 mg tablet [id number] Yes Take 1 tablet (10 mg) by mouth once daily. Historical Provider, MD Taking Active   celecoxib (CeleBREX) 100 mg capsule [id number] Yes Take 1 capsule (100 mg) by mouth once daily. Historical Provider, MD Taking Active   estazolam (Prosom) 2 mg tablet [id number] Yes (Schedule IV Drug) Oral for 30 Historical Provider, MD Taking Active   furosemide (Lasix) 20 mg tablet [id number] Yes Take by mouth. Historical Provider, MD Taking Active   gabapentin (Neurontin) 300 mg capsule [id number] Yes Take 1 capsule (300 mg) by mouth 3 times a day. Historical Provider, MD Taking Active   losartan (Cozaar) 100 mg tablet [id number] Yes Take 1 tablet (100 mg) by mouth once daily. Historical  Provider, MD Taking Active   magnesium oxide (Mag-Ox) 400 mg (241.3 mg magnesium) tablet [id number] Yes Take by mouth. Historical Provider, MD Taking Active   metFORMIN (Glucophage) 500 mg tablet [id number] Yes Take 1 tablet (500 mg) by mouth 2 times daily (morning and late afternoon). Historical Provider, MD Taking Active   metoprolol succinate XL (Toprol-XL) 50 mg 24 hr tablet [id number] Yes Take 1 tablet (50 mg) by mouth once daily. Historical Provider, MD Taking Active   omeprazole (PriLOSEC) 40 mg DR capsule [id number] Yes Take 1 capsule (40 mg) by mouth once daily. Historical Provider, MD Taking Active   venlafaxine 37.5 mg 24 hr tablet [id number] Yes Take 2 tablets (75 mg) by mouth once daily with breakfast. take 1 tablet by mouth once daily WITH FOOD FOR 7 DAYS THEN TAKE 2 TABLETS DAILY Historical Provider, MD Taking Active                    Allergies   Allergen Reactions    Celecoxib Hives    Hydroxyzine Unknown     May have had a seizure    Aspirin Hives     Regular aspirin upsets the stomach. 81 mg is ok       Review of Systems   Constitutional:  Negative for chills, fatigue and fever.   Respiratory:  Negative for shortness of breath and wheezing.    Cardiovascular:  Negative for chest pain and leg swelling.   Musculoskeletal:  Positive for arthralgias and neck pain.   Skin: Negative.        Exam   Pt is alert awake, orientated to person place and time. No acute distress. Mood is good. Good pulses and good sensation. Negative Smith's sign. No auditory wheezes. No JVD.  No hyperreflexia. No skin changes. Good capillary refill. Difficult to raise arm above 110 degrees, can take him to full passive. Good in rotator cuff strength. Pain is mostly deep over tenderness over the joint line.    Assessment   Labrum tear and shoulder sprain    Plan     His symptoms point to the joint and he clearly has a labral tear accepted part of his claim and a shoulder joint injection would be the appropriate treatment.. This  injection is the least invasive treatment and will allow us to avoid surgical treatment st this time. Follow up in 6 weeks.    I, Marley Root, attest that this documentation has been prepared under the direction and in the presence of Anup Mcconnell MD. By signing below, I, Anup Mcconnell MD, personally performed the services described in this documentation. All medical record entries made by the scribe were at my direction and in my presence. I have reviewed the chart and agree that the record reflects my personal performance and is accurate and complete.

## 2024-11-18 ENCOUNTER — APPOINTMENT (OUTPATIENT)
Dept: ORTHOPEDIC SURGERY | Facility: CLINIC | Age: 69
End: 2024-11-18
Payer: COMMERCIAL